# Patient Record
Sex: FEMALE | Race: BLACK OR AFRICAN AMERICAN | NOT HISPANIC OR LATINO | URBAN - METROPOLITAN AREA
[De-identification: names, ages, dates, MRNs, and addresses within clinical notes are randomized per-mention and may not be internally consistent; named-entity substitution may affect disease eponyms.]

---

## 2017-12-11 ENCOUNTER — INPATIENT (INPATIENT)
Facility: HOSPITAL | Age: 82
LOS: 6 days | Discharge: NURSING, REHAB OR LONG-TERM CARE | End: 2017-12-18
Attending: HOSPITALIST | Admitting: HOSPITALIST
Payer: MEDICARE

## 2017-12-11 VITALS
OXYGEN SATURATION: 99 % | TEMPERATURE: 99 F | HEART RATE: 79 BPM | RESPIRATION RATE: 18 BRPM | SYSTOLIC BLOOD PRESSURE: 159 MMHG | DIASTOLIC BLOOD PRESSURE: 79 MMHG

## 2017-12-11 DIAGNOSIS — G93.41 METABOLIC ENCEPHALOPATHY: ICD-10-CM

## 2017-12-11 DIAGNOSIS — I10 ESSENTIAL (PRIMARY) HYPERTENSION: ICD-10-CM

## 2017-12-11 DIAGNOSIS — N30.00 ACUTE CYSTITIS WITHOUT HEMATURIA: ICD-10-CM

## 2017-12-11 LAB
ALBUMIN SERPL ELPH-MCNC: 4 G/DL — SIGNIFICANT CHANGE UP (ref 3.3–5)
ALP SERPL-CCNC: 68 U/L — SIGNIFICANT CHANGE UP (ref 40–120)
ALT FLD-CCNC: 35 U/L — SIGNIFICANT CHANGE UP (ref 12–78)
AMMONIA BLD-MCNC: 13 UMOL/L — SIGNIFICANT CHANGE UP (ref 11–32)
ANION GAP SERPL CALC-SCNC: 7 MMOL/L — SIGNIFICANT CHANGE UP (ref 5–17)
APPEARANCE UR: CLEAR — SIGNIFICANT CHANGE UP
APTT BLD: 32.7 SEC — SIGNIFICANT CHANGE UP (ref 27.5–37.4)
AST SERPL-CCNC: 52 U/L — HIGH (ref 15–37)
BACTERIA # UR AUTO: ABNORMAL
BASOPHILS # BLD AUTO: 0 K/UL — SIGNIFICANT CHANGE UP (ref 0–0.2)
BASOPHILS NFR BLD AUTO: 0.4 % — SIGNIFICANT CHANGE UP (ref 0–2)
BILIRUB SERPL-MCNC: 0.6 MG/DL — SIGNIFICANT CHANGE UP (ref 0.2–1.2)
BILIRUB UR-MCNC: NEGATIVE — SIGNIFICANT CHANGE UP
BUN SERPL-MCNC: 27 MG/DL — HIGH (ref 7–23)
CALCIUM SERPL-MCNC: 9.6 MG/DL — SIGNIFICANT CHANGE UP (ref 8.5–10.1)
CHLORIDE SERPL-SCNC: 103 MMOL/L — SIGNIFICANT CHANGE UP (ref 96–108)
CK MB CFR SERPL CALC: 11.4 NG/ML — HIGH (ref 0.5–3.6)
CO2 SERPL-SCNC: 30 MMOL/L — SIGNIFICANT CHANGE UP (ref 22–31)
COLOR SPEC: YELLOW — SIGNIFICANT CHANGE UP
CREAT SERPL-MCNC: 1.17 MG/DL — SIGNIFICANT CHANGE UP (ref 0.5–1.3)
DIFF PNL FLD: ABNORMAL
EOSINOPHIL # BLD AUTO: 0 K/UL — SIGNIFICANT CHANGE UP (ref 0–0.5)
EOSINOPHIL NFR BLD AUTO: 0 % — SIGNIFICANT CHANGE UP (ref 0–6)
EPI CELLS # UR: SIGNIFICANT CHANGE UP
GLUCOSE SERPL-MCNC: 86 MG/DL — SIGNIFICANT CHANGE UP (ref 70–99)
GLUCOSE UR QL: NEGATIVE MG/DL — SIGNIFICANT CHANGE UP
HCT VFR BLD CALC: 38.2 % — SIGNIFICANT CHANGE UP (ref 34.5–45)
HGB BLD-MCNC: 12.6 G/DL — SIGNIFICANT CHANGE UP (ref 11.5–15.5)
INR BLD: 1.17 RATIO — HIGH (ref 0.88–1.16)
KETONES UR-MCNC: NEGATIVE — SIGNIFICANT CHANGE UP
LEUKOCYTE ESTERASE UR-ACNC: ABNORMAL
LYMPHOCYTES # BLD AUTO: 1.7 K/UL — SIGNIFICANT CHANGE UP (ref 1–3.3)
LYMPHOCYTES # BLD AUTO: 17.6 % — SIGNIFICANT CHANGE UP (ref 13–44)
MAGNESIUM SERPL-MCNC: 2.3 MG/DL — SIGNIFICANT CHANGE UP (ref 1.6–2.6)
MCHC RBC-ENTMCNC: 32.8 PG — SIGNIFICANT CHANGE UP (ref 27–34)
MCHC RBC-ENTMCNC: 33.1 GM/DL — SIGNIFICANT CHANGE UP (ref 32–36)
MCV RBC AUTO: 99.2 FL — SIGNIFICANT CHANGE UP (ref 80–100)
MONOCYTES # BLD AUTO: 1.3 K/UL — HIGH (ref 0–0.9)
MONOCYTES NFR BLD AUTO: 13.7 % — SIGNIFICANT CHANGE UP (ref 2–14)
NEUTROPHILS # BLD AUTO: 6.6 K/UL — SIGNIFICANT CHANGE UP (ref 1.8–7.4)
NEUTROPHILS NFR BLD AUTO: 68.2 % — SIGNIFICANT CHANGE UP (ref 43–77)
NITRITE UR-MCNC: NEGATIVE — SIGNIFICANT CHANGE UP
PH UR: 6 — SIGNIFICANT CHANGE UP (ref 5–8)
PLATELET # BLD AUTO: 125 K/UL — LOW (ref 150–400)
POTASSIUM SERPL-MCNC: 3.9 MMOL/L — SIGNIFICANT CHANGE UP (ref 3.5–5.3)
POTASSIUM SERPL-SCNC: 3.9 MMOL/L — SIGNIFICANT CHANGE UP (ref 3.5–5.3)
PROT SERPL-MCNC: 8.3 GM/DL — SIGNIFICANT CHANGE UP (ref 6–8.3)
PROT UR-MCNC: NEGATIVE MG/DL — SIGNIFICANT CHANGE UP
PROTHROM AB SERPL-ACNC: 12.8 SEC — HIGH (ref 9.8–12.7)
RBC # BLD: 3.85 M/UL — SIGNIFICANT CHANGE UP (ref 3.8–5.2)
RBC # FLD: 12.3 % — SIGNIFICANT CHANGE UP (ref 11–15)
RBC CASTS # UR COMP ASSIST: ABNORMAL /HPF (ref 0–4)
SODIUM SERPL-SCNC: 140 MMOL/L — SIGNIFICANT CHANGE UP (ref 135–145)
SP GR SPEC: 1.01 — SIGNIFICANT CHANGE UP (ref 1.01–1.02)
TROPONIN I SERPL-MCNC: 0.17 NG/ML — HIGH (ref 0.01–0.04)
TROPONIN I SERPL-MCNC: 0.18 NG/ML — HIGH (ref 0.01–0.04)
UROBILINOGEN FLD QL: NEGATIVE MG/DL — SIGNIFICANT CHANGE UP
WBC # BLD: 9.7 K/UL — SIGNIFICANT CHANGE UP (ref 3.8–10.5)
WBC # FLD AUTO: 9.7 K/UL — SIGNIFICANT CHANGE UP (ref 3.8–10.5)
WBC UR QL: ABNORMAL

## 2017-12-11 PROCEDURE — 71010: CPT | Mod: 26

## 2017-12-11 PROCEDURE — 99285 EMERGENCY DEPT VISIT HI MDM: CPT

## 2017-12-11 PROCEDURE — 99223 1ST HOSP IP/OBS HIGH 75: CPT

## 2017-12-11 PROCEDURE — 93010 ELECTROCARDIOGRAM REPORT: CPT

## 2017-12-11 PROCEDURE — 70450 CT HEAD/BRAIN W/O DYE: CPT | Mod: 26

## 2017-12-11 RX ORDER — CEFTRIAXONE 500 MG/1
1 INJECTION, POWDER, FOR SOLUTION INTRAMUSCULAR; INTRAVENOUS ONCE
Qty: 0 | Refills: 0 | Status: COMPLETED | OUTPATIENT
Start: 2017-12-11 | End: 2017-12-11

## 2017-12-11 RX ORDER — CEFTRIAXONE 500 MG/1
1 INJECTION, POWDER, FOR SOLUTION INTRAMUSCULAR; INTRAVENOUS EVERY 24 HOURS
Qty: 0 | Refills: 0 | Status: DISCONTINUED | OUTPATIENT
Start: 2017-12-11 | End: 2017-12-12

## 2017-12-11 RX ORDER — SODIUM CHLORIDE 9 MG/ML
1000 INJECTION INTRAMUSCULAR; INTRAVENOUS; SUBCUTANEOUS ONCE
Qty: 0 | Refills: 0 | Status: COMPLETED | OUTPATIENT
Start: 2017-12-11 | End: 2017-12-11

## 2017-12-11 RX ORDER — ENOXAPARIN SODIUM 100 MG/ML
30 INJECTION SUBCUTANEOUS DAILY
Qty: 0 | Refills: 0 | Status: DISCONTINUED | OUTPATIENT
Start: 2017-12-11 | End: 2017-12-18

## 2017-12-11 RX ORDER — ATENOLOL 25 MG/1
50 TABLET ORAL DAILY
Qty: 0 | Refills: 0 | Status: DISCONTINUED | OUTPATIENT
Start: 2017-12-11 | End: 2017-12-18

## 2017-12-11 RX ADMIN — ENOXAPARIN SODIUM 30 MILLIGRAM(S): 100 INJECTION SUBCUTANEOUS at 23:31

## 2017-12-11 RX ADMIN — ATENOLOL 50 MILLIGRAM(S): 25 TABLET ORAL at 16:29

## 2017-12-11 RX ADMIN — CEFTRIAXONE 100 GRAM(S): 500 INJECTION, POWDER, FOR SOLUTION INTRAMUSCULAR; INTRAVENOUS at 15:23

## 2017-12-11 RX ADMIN — SODIUM CHLORIDE 1000 MILLILITER(S): 9 INJECTION INTRAMUSCULAR; INTRAVENOUS; SUBCUTANEOUS at 15:23

## 2017-12-11 NOTE — ED ADULT TRIAGE NOTE - CHIEF COMPLAINT QUOTE
as per family member, " pt fell yesterday and hit her face and head. now she is more confused than normal and not acting like herself." pt denies headache. on amitriptyline and ativan at home. unknown last seen normal. as per family pt lives alone.

## 2017-12-11 NOTE — ED PROVIDER NOTE - PHYSICAL EXAMINATION
Gen: Frail, Alert, NAD  Head: NC, AT   Eyes: PERRL, EOMI, normal lids/conjunctiva  ENT: normal hearing, patent oropharynx without erythema/exudate, uvula midline  Neck: supple, no tenderness, Trachea midline  Pulm: Bilateral BS, normal resp effort, no wheeze/stridor/retractions  CV: RRR, no M/R/G, 2+ radial and dp pulses bl, no edema  Abd: soft, NT/ND, +BS, no hepatosplenomegaly  Mskel: extremities x4 with normal ROM and no joint effusions. no ctl spine ttp.   Skin: no rash, no bruising   Neuro: AAOx3, no sensory/motor deficits, CN 2-12 intact

## 2017-12-11 NOTE — H&P ADULT - NSHPPHYSICALEXAM_GEN_ALL_CORE
GENERAL: NAD well-developed  HEAD:  Atraumatic, Normocephalic  EYES: EOMI, PERRLA, conjunctiva and sclera clear  ENMT: No tonsillar erythema, exudates, or enlargement; Moist mucous membranes, Good dentition, No lesions  NECK: Supple, No JVD, Normal thyroid  NERVOUS SYSTEM:  Alert & Oriented X1, Motor Strength 5/5 B/L upper and lower extremities; DTRs 2+ intact and symmetric  CHEST/LUNG: Clear to percussion bilaterally; No rales, rhonchi, wheezing, or rubs  HEART: Regular rate and rhythm; No murmurs, rubs, or gallops  ABDOMEN: Soft, Nontender, Nondistended; Bowel sounds present  EXTREMITIES:  2+ Peripheral Pulses, No clubbing, cyanosis, or edema  LYMPH: No lymphadenopathy   SKIN: No rashes or lesions

## 2017-12-11 NOTE — ED PROVIDER NOTE - MEDICAL DECISION MAKING DETAILS
patient pw confusion. presently, she is oriented x3 and is following commands. will rule out subdural hematoma, elecrolyte abnormality and uti. suspect this is related to amitryptiline and or alprazolam. both medications can cause confusion. patient pw confusion. presently, she is oriented x3 and is following commands. will rule out subdural hematoma, elecrolyte abnormality and uti. suspect this is related to amitryptiline and or alprazolam. both medications can cause confusion. UA suggests infection, will treat UTI. Labs remarkable for slightly elevated trop. No anginal symptoms, possibly type 2 event. Will give asa pending negative CT. my read of ekg is nomarl sinus with q waves in the septal leads and possible inverted t waves in v5 isolated. no interval abnormalties

## 2017-12-11 NOTE — ED PROVIDER NOTE - OBJECTIVE STATEMENT
Pertinent PMH/PSH/FHx/SHx and Review of Systems contained within:  89f hx of anxiety pw confusion. patient reportedly fell and hit head yesterday. no loc. overnight was behaving abnormally, calling out to someone who was not there and banging pots, according to her tenant. patient reports that she is just a bit confused. she notes hunger. she denies cp, ha, sob, nausea, vomiting. of note, patient is prescribed amitryptiline 10mg 4 times a day prn as well as alprazolam.   Fh and Sh not otherwise contributory  ROS otherwise negative

## 2017-12-11 NOTE — H&P ADULT - NSHPLABSRESULTS_GEN_ALL_CORE
12.6   9.7   )-----------( 125      ( 11 Dec 2017 14:19 )             38.2   12-11    140  |  103  |  27<H>  ----------------------------<  86  3.9   |  30  |  1.17    Ca    9.6      11 Dec 2017 14:19  Mg     2.3     12-11    TPro  8.3  /  Alb  4.0  /  TBili  0.6  /  DBili  x   /  AST  52<H>  /  ALT  35  /  AlkPhos  68  12-11  Urine Microscopic-Add On (NC) (12.11.17 @ 14:19)    Red Blood Cell - Urine: 3-5 /HPF    White Blood Cell - Urine: 6-10    Bacteria: Few    Epithelial Cells: Few

## 2017-12-11 NOTE — ED PROVIDER NOTE - CARE PLAN
Principal Discharge DX:	Confusion Principal Discharge DX:	Confusion  Secondary Diagnosis:	Infective urethritis

## 2017-12-11 NOTE — H&P ADULT - HISTORY OF PRESENT ILLNESS
89f hx of anxiety pw confusion. patient reportedly fell and hit head yesterday. no loc. overnight was behaving abnormally, calling out to someone who was not there and banging pots, according to her tenant. patient reports that she is just a bit confused. she notes hunger. she denies cp, ha, sob, nausea, vomiting. of note, patient is prescribed amitryptiline that was started four days ago  - 10mg 4 times a day prn as well as alprazolam. patient is still very confused and does not recognize her family .

## 2017-12-11 NOTE — H&P ADULT - ASSESSMENT
89 female with history of anxiety with new onset Altered Mental Status - recently started on elavil- polypharmacy vs infecton vs cerebrovascular accident as etiology     IMPROVE VTE Individual Risk Assessment          RISK                                                          Points    [  ] Previous VTE                                                3    [  ] Thrombophilia                                             2    [  ] Lower limb paralysis                                    2        (unable to hold up >15 seconds)      [  ] Current Cancer                                             2         (within 6 months)    [ x ] Immobilization > 24 hrs                              1    [  ] ICU/CCU stay > 24 hours                            1    [x  ] Age > 60                                                    1    IMPROVE VTE Score ____2_____ 89 female with history of anxiety with new onset Altered Mental Status - recently started on elavil- polypharmacy vs infecton vs cerebrovascular accident as etiology     IMPROVE VTE Individual Risk Assessment          RISK                                                          Points    [  ] Previous VTE                                                3    [  x] Thrombophilia                                             2    [  ] Lower limb paralysis                                    2        (unable to hold up >15 seconds)      [  ] Current Cancer                                             2         (within 6 months)    [ x ] Immobilization > 24 hrs                              1    [  ] ICU/CCU stay > 24 hours                            1    [x  ] Age > 60                                                    1    IMPROVE VTE Score ____2_____    problems- metabolic encephalopathy  plan - hold anxiety meds  trat possible urinary tract infection  ct of head pending  problem 2- Hypertension - restart atenolol   problem 3 urinary tract infection - rocephin 89 female with history of anxiety with new onset Altered Mental Status - recently started on elavil- polypharmacy vs infecton vs cerebrovascular accident as etiology     IMPROVE VTE Individual Risk Assessment          RISK                                                          Points    [  ] Previous VTE                                                3    [  x] Thrombophilia                                             2    [  ] Lower limb paralysis                                    2        (unable to hold up >15 seconds)      [  ] Current Cancer                                             2         (within 6 months)    [ x ] Immobilization > 24 hrs                              1    [  ] ICU/CCU stay > 24 hours                            1    [x  ] Age > 60                                                    1    IMPROVE VTE Score ____2_____    problems- metabolic encephalopathy  plan - hold anxiety meds  trat possible urinary tract infection  ct of head pending  problem 2- Hypertension - restart atenolol   problem 3 urinary tract infection - rocephin   problem4- elevated troponin -trend troponin

## 2017-12-11 NOTE — ED ADULT NURSE NOTE - OBJECTIVE STATEMENT
patient received, came here for confusion that was noticed by daughter this morning. as per daughter, tenant noticed patient around 0200 banging pots and talking to herself and when daughter arrived patient was crying. patient is oriented to person, time and place but when asked questions, does not seem to understand and does not answer the questions correctly. patient is seen very happy.

## 2017-12-12 DIAGNOSIS — R41.0 DISORIENTATION, UNSPECIFIED: ICD-10-CM

## 2017-12-12 DIAGNOSIS — F09 UNSPECIFIED MENTAL DISORDER DUE TO KNOWN PHYSIOLOGICAL CONDITION: ICD-10-CM

## 2017-12-12 DIAGNOSIS — R74.8 ABNORMAL LEVELS OF OTHER SERUM ENZYMES: ICD-10-CM

## 2017-12-12 DIAGNOSIS — F05 DELIRIUM DUE TO KNOWN PHYSIOLOGICAL CONDITION: ICD-10-CM

## 2017-12-12 LAB
ANION GAP SERPL CALC-SCNC: 9 MMOL/L — SIGNIFICANT CHANGE UP (ref 5–17)
BUN SERPL-MCNC: 16 MG/DL — SIGNIFICANT CHANGE UP (ref 7–23)
CALCIUM SERPL-MCNC: 8.5 MG/DL — SIGNIFICANT CHANGE UP (ref 8.5–10.1)
CHLORIDE SERPL-SCNC: 108 MMOL/L — SIGNIFICANT CHANGE UP (ref 96–108)
CO2 SERPL-SCNC: 25 MMOL/L — SIGNIFICANT CHANGE UP (ref 22–31)
CREAT SERPL-MCNC: 1.04 MG/DL — SIGNIFICANT CHANGE UP (ref 0.5–1.3)
CULTURE RESULTS: SIGNIFICANT CHANGE UP
GLUCOSE SERPL-MCNC: 102 MG/DL — HIGH (ref 70–99)
HCT VFR BLD CALC: 38.9 % — SIGNIFICANT CHANGE UP (ref 34.5–45)
HGB BLD-MCNC: 12.5 G/DL — SIGNIFICANT CHANGE UP (ref 11.5–15.5)
MCHC RBC-ENTMCNC: 32.3 GM/DL — SIGNIFICANT CHANGE UP (ref 32–36)
MCHC RBC-ENTMCNC: 32.5 PG — SIGNIFICANT CHANGE UP (ref 27–34)
MCV RBC AUTO: 100.7 FL — HIGH (ref 80–100)
PLATELET # BLD AUTO: 122 K/UL — LOW (ref 150–400)
POTASSIUM SERPL-MCNC: 3.8 MMOL/L — SIGNIFICANT CHANGE UP (ref 3.5–5.3)
POTASSIUM SERPL-SCNC: 3.8 MMOL/L — SIGNIFICANT CHANGE UP (ref 3.5–5.3)
RBC # BLD: 3.86 M/UL — SIGNIFICANT CHANGE UP (ref 3.8–5.2)
RBC # FLD: 12 % — SIGNIFICANT CHANGE UP (ref 11–15)
SODIUM SERPL-SCNC: 142 MMOL/L — SIGNIFICANT CHANGE UP (ref 135–145)
SPECIMEN SOURCE: SIGNIFICANT CHANGE UP
T4 AB SER-ACNC: 7.2 UG/DL — SIGNIFICANT CHANGE UP (ref 4.6–12)
TROPONIN I SERPL-MCNC: 0.21 NG/ML — HIGH (ref 0.01–0.04)
TROPONIN I SERPL-MCNC: 0.21 NG/ML — HIGH (ref 0.01–0.04)
TSH SERPL-MCNC: 0.79 UU/ML — SIGNIFICANT CHANGE UP (ref 0.36–3.74)
VIT B12 SERPL-MCNC: 1437 PG/ML — HIGH (ref 243–894)
WBC # BLD: 9.4 K/UL — SIGNIFICANT CHANGE UP (ref 3.8–10.5)
WBC # FLD AUTO: 9.4 K/UL — SIGNIFICANT CHANGE UP (ref 3.8–10.5)

## 2017-12-12 PROCEDURE — 99233 SBSQ HOSP IP/OBS HIGH 50: CPT

## 2017-12-12 PROCEDURE — 99223 1ST HOSP IP/OBS HIGH 75: CPT

## 2017-12-12 PROCEDURE — 90792 PSYCH DIAG EVAL W/MED SRVCS: CPT

## 2017-12-12 RX ORDER — ATORVASTATIN CALCIUM 80 MG/1
10 TABLET, FILM COATED ORAL AT BEDTIME
Qty: 0 | Refills: 0 | Status: DISCONTINUED | OUTPATIENT
Start: 2017-12-12 | End: 2017-12-15

## 2017-12-12 RX ORDER — ASPIRIN/CALCIUM CARB/MAGNESIUM 324 MG
81 TABLET ORAL DAILY
Qty: 0 | Refills: 0 | Status: DISCONTINUED | OUTPATIENT
Start: 2017-12-12 | End: 2017-12-18

## 2017-12-12 RX ADMIN — Medication 0.5 MILLIGRAM(S): at 18:54

## 2017-12-12 RX ADMIN — ATENOLOL 50 MILLIGRAM(S): 25 TABLET ORAL at 06:16

## 2017-12-12 RX ADMIN — ENOXAPARIN SODIUM 30 MILLIGRAM(S): 100 INJECTION SUBCUTANEOUS at 13:34

## 2017-12-12 NOTE — CONSULT NOTE ADULT - ASSESSMENT
88yo lady with a hx of anxiety; sent to the ER after being found confused and minimally responsive.  Currently hallucinating.  Of note, patient prescribed amitriptyline and xanax a few days ago; multiple pills missing.  Patient is still very confused and does not recognize her family.  Head CT unremarkable.  Mumbling and making no sense.    No hx of CAD.  No tele events.  trop 0.1 - 0.2  EKG normal.    Elevated troponin   Acute delirium  Acute cystitis without hematuria  Essential hypertension  Metabolic encephalopathy    -mild troponin leak likely secondary to infection/delirium/withdrawl and NOT ACS  -cont to treat metabolic encephalopathy; consider repeat head CT  -supportive care  -?abx for UTI/cystitis  -cont asa/atenolol/simvastatin  -outpatient cardiac evaluation when stable

## 2017-12-12 NOTE — PROGRESS NOTE ADULT - PROBLEM SELECTOR PLAN 1
- pt was prescribed Amitriptylline and xanax by her neurologist ( [phone]) in october. Unsure if overdose or withdrawl  ??  -discussed w/   - As per joe Gambino ,pt was fully independent before this episode and was at baseline mental status earlier on sunday too - CT head showed no abnormalities   - pt was prescribed Amitriptylline and xanax by her neurologist ( [phone]) in october. Unsure if overdose or withdrawl  ??  -discussed w/   - As per joe Gambino ,pt was fully independent before this episode and was at baseline mental status earlier on sunday too

## 2017-12-12 NOTE — BEHAVIORAL HEALTH ASSESSMENT NOTE - SUMMARY
Briefly, the patient is an 89 year old woman, single, lives at home by herself, has a tenant who lives upstairs, niece lives a mile away and is supportive, has a medical history notable for HTN, has no history of mental health issues, has no history of si or hi, and no history of substance abuse, presented to the ed yesterday with family after it was noted that she was confused and disoriented. Admitted for treatment of UTI, on Ceftriaxone. Calm, can be restless, is confused, disoriented, VH++. Unable to engage patient in any meaningful conversation. No UE rigidity or cogwheeling, no evident tremors at rest.  Based on assessment, and collateral information obtained from niece, this is an acute change in mentation for the patient. As of now, logical explanation is this is medication related- anticholinergic side effect from recent start of Amitriptyline? vs Xanax withdrawal? Discussed with Dr Isaac that for now hold Amitriptyline. Also start on a CIWA protocol to monitor for benzodiazepine withdrawal and give Briefly, the patient is an 89 year old woman, single, lives at home by herself, has a tenant who lives upstairs, niece lives a mile away and is supportive, has a medical history notable for HTN, has no history of mental health issues, has no history of si or hi, and no history of substance abuse, presented to the ed yesterday with family after it was noted that she was confused and disoriented. Admitted for treatment of UTI, on Ceftriaxone. Calm, can be restless, is confused, disoriented, VH++. Unable to engage patient in any meaningful conversation. No UE rigidity or cogwheeling, no evident tremors at rest.  Based on assessment, and collateral information obtained from niece, this is an acute change in mentation for the patient. As of now, logical explanation is this is medication related- anticholinergic side effect from recent start of Amitriptyline? vs Xanax withdrawal? Discussed with Dr Isaac that for now hold Amitriptyline. Also start on a CIWA protocol to monitor for benzodiazepine withdrawal and give low dose Ativan 0.5mg q 6hrs prn IM/IV/PO for w/d symptoms. If confusion does not resolve in a few days, please consider a Brain MRI.

## 2017-12-12 NOTE — PROGRESS NOTE ADULT - SUBJECTIVE AND OBJECTIVE BOX
Patient is a 89y old  Female who presents with a chief complaint of Altered Mental Status (11 Dec 2017 15:57)       OVERNIGHT EVENTS: confused    MEDICATIONS  (STANDING):  ATENolol  Tablet 50 milliGRAM(s) Oral daily  cefTRIAXone   IVPB 1 Gram(s) IV Intermittent every 24 hours  enoxaparin Injectable 30 milliGRAM(s) SubCutaneous daily    MEDICATIONS  (PRN):    Vital Signs Last 24 Hrs  T(C): 37 (12 Dec 2017 05:33), Max: 37.3 (11 Dec 2017 16:21)  T(F): 98.6 (12 Dec 2017 05:33), Max: 99.1 (11 Dec 2017 16:21)  HR: 78 (12 Dec 2017 05:33) (66 - 80)  BP: 155/81 (12 Dec 2017 05:33) (152/62 - 175/82)  BP(mean): --  RR: 18 (12 Dec 2017 05:33) (16 - 20)  SpO2: 95% (12 Dec 2017 05:33) (94% - 99%)    PHYSICAL EXAM:  GENERAL: NAD, well-groomed, well-developed  HEAD:  Atraumatic, Normocephalic  EYES: EOMI, PERRLA, conjunctiva and sclera clear  ENMT: No tonsillar erythema, exudates, or enlargement; Moist mucous membranes   NECK: Supple, No JVD   NERVOUS SYSTEM: Awake, not oriented , confused, speaking to herself   CHEST/LUNG: Clear to auscultation  bilaterally; No rales, rhonchi, wheezing, or rubs  HEART: Regular rate and rhythm; No murmurs, rubs, or gallops  ABDOMEN: Soft, Nontender, Nondistended; Bowel sounds present  EXTREMITIES:  2+ Peripheral Pulses, No clubbing, cyanosis, or edema  LYMPH: No lymphadenopathy noted  SKIN: No rashes or lesions    LABS:                        12.5   9.4   )-----------( 122      ( 12 Dec 2017 04:14 )             38.9     12-12    142  |  108  |  16  ----------------------------<  102<H>  3.8   |  25  |  1.04    Ca    8.5      12 Dec 2017 04:14  Mg     2.3     12-11    TPro  8.3  /  Alb  4.0  /  TBili  0.6  /  DBili  x   /  AST  52<H>  /  ALT  35  /  AlkPhos  68  12-11    PT/INR - ( 11 Dec 2017 14:19 )   PT: 12.8 sec;   INR: 1.17 ratio         PTT - ( 11 Dec 2017 14:19 )  PTT:32.7 sec   cardiac markers Troponin .207  CK --  Troponin .165  CK --  Troponin .176  CK --    Urinalysis Basic - ( 11 Dec 2017 14:19 )    Color: Yellow / Appearance: Clear / S.010 / pH: x  Gluc: x / Ketone: Negative  / Bili: Negative / Urobili: Negative mg/dL   Blood: x / Protein: Negative mg/dL / Nitrite: Negative   Leuk Esterase: Moderate / RBC: 3-5 /HPF / WBC 6-10   Sq Epi: x / Non Sq Epi: Few / Bacteria: Few      CAPILLARY BLOOD GLUCOSE        Cultures    RADIOLOGY & ADDITIONAL TESTS:    Imaging Personally Reviewed:  [x ] YES  [ ] NO    Consultant(s) Notes Reviewed:  [x ] YES  [ ] NO    Care Discussed with Consultants/Other Providers [x ] YES  [ ] NO

## 2017-12-12 NOTE — BEHAVIORAL HEALTH ASSESSMENT NOTE - RISK ASSESSMENT
confused, disoriented, possible cognitive decline at baseline, needs more services and supports in community- unclear about extent of residual cognitive deficits at end of this hospital course. No aggression, no history of mental health issues.

## 2017-12-12 NOTE — CONSULT NOTE ADULT - SUBJECTIVE AND OBJECTIVE BOX
CARDIOLOGY CONSULT NOTE    Patient is a 89y Female with a known history of :  Cognitive disorder (F09)  Delirium due to another medical condition, acute, mixed level of activity (F05)  Elevated troponin (R74.8)  Acute delirium (R41.0)  Acute cystitis without hematuria (N30.00)  Essential hypertension (I10)  Metabolic encephalopathy (G93.41)    HPI:  88yo lady with a hx of anxiety; sent to the ER after being found confused and minimally responsive.  Currently hallucinating.  Of note, patient prescribed amitriptyline and xanax a few days ago; multiple pills missing.  patient is still very confused and does not recognize her family.  Head CT unremarkable.  No hx of CAD.  No tele events.  trop 0.1 - 0.2    REVIEW OF SYSTEMS:  unable to obtain    MEDICATIONS  (STANDING):  aspirin enteric coated 81 milliGRAM(s) Oral daily  ATENolol  Tablet 50 milliGRAM(s) Oral daily  atorvastatin 10 milliGRAM(s) Oral at bedtime  enoxaparin Injectable 30 milliGRAM(s) SubCutaneous daily    MEDICATIONS  (PRN):      ALLERGIES: No Known Allergies      FAMILY HISTORY:  No pertinent family history in first degree relatives      PHYSICAL EXAMINATION:  -----------------------------  T(C): 36.1 (12-12-17 @ 11:05), Max: 37.3 (12-11-17 @ 16:21)  HR: 80 (12-12-17 @ 11:05) (66 - 80)  BP: 145/75 (12-12-17 @ 11:05) (145/75 - 175/82)  RR: 18 (12-12-17 @ 05:33) (16 - 20)  SpO2: 96% (12-12-17 @ 11:05) (94% - 99%)  Wt(kg): --    12-11 @ 07:01  -  12-12 @ 07:00  --------------------------------------------------------  IN:  Total IN: 0 mL    OUT:    Voided: 400 mL  Total OUT: 400 mL    Total NET: -400 mL        Height (cm): 154.94 (12-12 @ 02:45)  Weight (kg): 49.9 (12-12 @ 02:45)  BMI (kg/m2): 20.8 (12-12 @ 02:45)  BSA (m2): 1.47 (12-12 @ 02:45)    Constitutional: hallucinating; responding appropriately   Eyes: the conjunctiva exhibited no abnormalities and the eyelids demonstrated no xanthelasmas.   HEENT: normal oral mucosa, no oral pallor and no oral cyanosis.   Neck: normal jugular venous A waves present, normal jugular venous V waves present and no jugular venous hammonds A waves.   Pulmonary: no respiratory distress, normal respiratory rhythm and effort, no accessory muscle use and lungs were clear to auscultation bilaterally.   Cardiovascular: heart rate and rhythm were normal, normal S1 and S2 and no murmur, gallop, rub, heave or thrill are present.   Abdomen: soft, non-tender, no hepato-splenomegaly and no abdominal mass palpated.   Musculoskeletal: the gait could not be assessed..   Extremities: no clubbing of the fingernails, no localized cyanosis, no petechial hemorrhages and no ischemic changes.   Skin: normal skin color and pigmentation, no rash, no venous stasis, no skin lesions, no skin ulcer and no xanthoma was observed.   Psychiatric: oriented to person, place, and time, the affect was normal, the mood was normal and not feeling anxious.     LABS:   --------  12-12    142  |  108  |  16  ----------------------------<  102<H>  3.8   |  25  |  1.04    Ca    8.5      12 Dec 2017 04:14  Mg     2.3     12-11    TPro  8.3  /  Alb  4.0  /  TBili  0.6  /  DBili  x   /  AST  52<H>  /  ALT  35  /  AlkPhos  68  12-11                         12.5   9.4   )-----------( 122      ( 12 Dec 2017 04:14 )             38.9     PT/INR - ( 11 Dec 2017 14:19 )   PT: 12.8 sec;   INR: 1.17 ratio         PTT - ( 11 Dec 2017 14:19 )  PTT:32.7 sec    12-12 @ 11:44 CPK total:--, CKMB --, Troponin I - .207 ng/mL<H>  12-12 @ 04:14 CPK total:--, CKMB --, Troponin I - .207 ng/mL<H>  12-11 @ 20:10 CPK total:--, CKMB --, Troponin I - .165 ng/mL<H>  12-11 @ 14:19 CPK total:--, CKMB --, Troponin I - .176 ng/mL<H>          RADIOLOGY:  -----------------        ECG: CARDIOLOGY CONSULT NOTE    Patient is a 89y Female with a known history of :  Cognitive disorder (F09)  Delirium due to another medical condition, acute, mixed level of activity (F05)  Elevated troponin (R74.8)  Acute delirium (R41.0)  Acute cystitis without hematuria (N30.00)  Essential hypertension (I10)  Metabolic encephalopathy (G93.41)    HPI:  88yo lady with a hx of anxiety; sent to the ER after being found confused and minimally responsive.  Currently hallucinating.  Of note, patient prescribed amitriptyline and xanax a few days ago; multiple pills missing.  patient is still very confused and does not recognize her family.  Head CT unremarkable.  No hx of CAD.  No tele events.  trop 0.1 - 0.2    REVIEW OF SYSTEMS:  unable to obtain    MEDICATIONS  (STANDING):  aspirin enteric coated 81 milliGRAM(s) Oral daily  ATENolol  Tablet 50 milliGRAM(s) Oral daily  atorvastatin 10 milliGRAM(s) Oral at bedtime  enoxaparin Injectable 30 milliGRAM(s) SubCutaneous daily    MEDICATIONS  (PRN):      ALLERGIES: No Known Allergies      FAMILY HISTORY:  No pertinent family history in first degree relatives      PHYSICAL EXAMINATION:  -----------------------------  T(C): 36.1 (12-12-17 @ 11:05), Max: 37.3 (12-11-17 @ 16:21)  HR: 80 (12-12-17 @ 11:05) (66 - 80)  BP: 145/75 (12-12-17 @ 11:05) (145/75 - 175/82)  RR: 18 (12-12-17 @ 05:33) (16 - 20)  SpO2: 96% (12-12-17 @ 11:05) (94% - 99%)  Wt(kg): --    12-11 @ 07:01  -  12-12 @ 07:00  --------------------------------------------------------  IN:  Total IN: 0 mL    OUT:    Voided: 400 mL  Total OUT: 400 mL    Total NET: -400 mL        Height (cm): 154.94 (12-12 @ 02:45)  Weight (kg): 49.9 (12-12 @ 02:45)  BMI (kg/m2): 20.8 (12-12 @ 02:45)  BSA (m2): 1.47 (12-12 @ 02:45)    Constitutional: hallucinating; responding appropriately   Eyes: the conjunctiva exhibited no abnormalities and the eyelids demonstrated no xanthelasmas.   HEENT: normal oral mucosa, no oral pallor and no oral cyanosis.   Neck: normal jugular venous A waves present, normal jugular venous V waves present and no jugular venous hammonds A waves.   Pulmonary: no respiratory distress  Cardiovascular: heart rate and rhythm were normal, normal S1 and S2 and no murmur, gallop, rub, heave or thrill are present.   Abdomen: soft, non-tender, no hepato-splenomegaly and no abdominal mass palpated.   Musculoskeletal: the gait could not be assessed..   Extremities: no clubbing of the fingernails, no localized cyanosis, no petechial hemorrhages and no ischemic changes.   Skin: normal skin color and pigmentation, no rash, no venous stasis, no skin lesions, no skin ulcer and no xanthoma was observed.   Psychiatric:  A + O x 0    LABS:   --------  12-12    142  |  108  |  16  ----------------------------<  102<H>  3.8   |  25  |  1.04    Ca    8.5      12 Dec 2017 04:14  Mg     2.3     12-11    TPro  8.3  /  Alb  4.0  /  TBili  0.6  /  DBili  x   /  AST  52<H>  /  ALT  35  /  AlkPhos  68  12-11                         12.5   9.4   )-----------( 122      ( 12 Dec 2017 04:14 )             38.9     PT/INR - ( 11 Dec 2017 14:19 )   PT: 12.8 sec;   INR: 1.17 ratio         PTT - ( 11 Dec 2017 14:19 )  PTT:32.7 sec    12-12 @ 11:44 CPK total:--, CKMB --, Troponin I - .207 ng/mL<H>  12-12 @ 04:14 CPK total:--, CKMB --, Troponin I - .207 ng/mL<H>  12-11 @ 20:10 CPK total:--, CKMB --, Troponin I - .165 ng/mL<H>  12-11 @ 14:19 CPK total:--, CKMB --, Troponin I - .176 ng/mL<H>      RADIOLOGY:  -----------------    ECG: NSR 73bpm

## 2017-12-12 NOTE — PROGRESS NOTE ADULT - PROBLEM SELECTOR PLAN 2
- trend trops, EKG noted  - cardiology  consulted  - Telemetry monitoring - trend trops, EKG noted  - on ASA, statin   - cardiology  consulted  - Telemetry monitoring - trend trops, EKG noted  - on ASA, statin, BB   - cardiology  consulted  - Telemetry monitoring

## 2017-12-12 NOTE — PHYSICAL THERAPY INITIAL EVALUATION ADULT - MODIFIED CLINICAL TEST OF SENSORY INTEGRATION IN BALANCE TEST
Barthel Index: Feeding Score _5__, Bathing Score _0__, Grooming Score __0_, Dressing Score _0__, Bowels Score __0_, Bladder Score ___0, Toilet Score __0_, Transfers Score __5_, Mobility Score __0_, Stairs Score _0__,     Total Score _10__

## 2017-12-12 NOTE — BEHAVIORAL HEALTH ASSESSMENT NOTE - HPI (INCLUDE ILLNESS QUALITY, SEVERITY, DURATION, TIMING, CONTEXT, MODIFYING FACTORS, ASSOCIATED SIGNS AND SYMPTOMS)
Briefly, the patient is an 89 year old woman, single, lives at home by herself, has a tenant who lives upstairs, niece lives a mile away and is supportive, has a medical history notable for HTN, has no history of mental health issues, has no history of si or hi, and no history of substance abuse, presented to the ed yesterday with family after it was noted that she was confused and disoriented. Admitted for treatment of UTI, on Ceftriaxone.   Calm, but confused, disoriented, VH++. Unable to engage patient in any meaningful conversation. No UE rigidity or cogwheeling, no evident tremors at rest.   Obtained collateral from the patient's niece, who states that at baseline patient is grossly cognitively intact, except for tendency to repeat herself. She is independent in ADL's and IADLs with some minimal assistance. No substance abuse, no mental health issues. Joe states that patient had a fall on Sunday morning, and was mentating fine till evening when she was noted to be confused. Reviewed medications, and joe states that outpatient neurologist started her on Amitriptyline 10mg qid prn po on December 6. States that she was prescribed 120 tablets, and now there are 104 tablets in bottle. Neurologist prescribed Xanax 1mg qid prn po in October- 120 tablets, and now there are 7 tablets remaining. Joe states that even though neurologist discontinued xanax, she is unsure if patient was still taking it as needed.   Obtained collateral from the patient's neurologist- Dr Burnette ([phone]) and he states that patient is being treated for carpal tunnel and cervical radiculopathy. He states that patient likely was not taking Amitriptyline as prescribed, and when she called him a few days ago sounded confused over the phone.

## 2017-12-13 LAB
FOLATE SERPL-MCNC: >20 NG/ML — SIGNIFICANT CHANGE UP (ref 4.8–24.2)
T3 SERPL-MCNC: 87 NG/DL — SIGNIFICANT CHANGE UP (ref 80–200)

## 2017-12-13 PROCEDURE — 99233 SBSQ HOSP IP/OBS HIGH 50: CPT

## 2017-12-13 RX ADMIN — ENOXAPARIN SODIUM 30 MILLIGRAM(S): 100 INJECTION SUBCUTANEOUS at 11:49

## 2017-12-13 RX ADMIN — ATORVASTATIN CALCIUM 10 MILLIGRAM(S): 80 TABLET, FILM COATED ORAL at 21:08

## 2017-12-13 RX ADMIN — Medication 81 MILLIGRAM(S): at 11:48

## 2017-12-13 RX ADMIN — ATENOLOL 50 MILLIGRAM(S): 25 TABLET ORAL at 07:07

## 2017-12-13 RX ADMIN — Medication 0.5 MILLIGRAM(S): at 00:51

## 2017-12-13 NOTE — PROGRESS NOTE ADULT - SUBJECTIVE AND OBJECTIVE BOX
Patient is a 89y old  Female who presents with a chief complaint of Altered Mental Status (11 Dec 2017 15:57)       OVERNIGHT EVENTS:     MEDICATIONS  (STANDING):  aspirin enteric coated 81 milliGRAM(s) Oral daily  ATENolol  Tablet 50 milliGRAM(s) Oral daily  atorvastatin 10 milliGRAM(s) Oral at bedtime  enoxaparin Injectable 30 milliGRAM(s) SubCutaneous daily    MEDICATIONS  (PRN):  LORazepam   Injectable 0.5 milliGRAM(s) IV Push every 6 hours PRN Agitation      Vital Signs Last 24 Hrs  T(C): 37.1 (13 Dec 2017 06:53), Max: 37.1 (12 Dec 2017 16:50)  T(F): 98.8 (13 Dec 2017 06:53), Max: 98.8 (12 Dec 2017 16:50)  HR: 75 (13 Dec 2017 06:53) (75 - 86)  BP: 155/70 (13 Dec 2017 06:53) (152/71 - 161/74)  BP(mean): --  RR: 18 (13 Dec 2017 06:53) (18 - 18)  SpO2: 96% (13 Dec 2017 06:53) (96% - 98%)     PHYSICAL EXAM:  GENERAL: NAD, well-groomed, well-developed  HEAD:  Atraumatic, Normocephalic  EYES: EOMI, PERRLA, conjunctiva and sclera clear  ENMT: No tonsillar erythema, exudates, or enlargement; Moist mucous membranes   NECK: Supple, No JVD   NERVOUS SYSTEM: Awake,  orientedx1 , improved mental status since yesterday, recognizes her grand niece at bedside    CHEST/LUNG: Clear to auscultation  bilaterally; No rales, rhonchi, wheezing, or rubs  HEART: Regular rate and rhythm; No murmurs, rubs, or gallops  ABDOMEN: Soft, Nontender, Nondistended; Bowel sounds present  EXTREMITIES:  2+ Peripheral Pulses, No clubbing, cyanosis, or edema  LYMPH: No lymphadenopathy noted  SKIN: No rashes or lesions    LABS:                        12.5   9.4   )-----------( 122      ( 12 Dec 2017 04:14 )             38.9     12-12    142  |  108  |  16  ----------------------------<  102<H>  3.8   |  25  |  1.04    Ca    8.5      12 Dec 2017 04:14         cardiac markers     CAPILLARY BLOOD GLUCOSE        Cultures    RADIOLOGY & ADDITIONAL TESTS:    Imaging Personally Reviewed:  [ x] YES  [ ] NO    Consultant(s) Notes Reviewed:  [x ] YES  [ ] NO    Care Discussed with Consultants/Other Providers [x] YES  [ ] NO

## 2017-12-13 NOTE — PROGRESS NOTE ADULT - PROBLEM SELECTOR PLAN 1
- CT head showed no abnormalities   - pt was prescribed Amitriptylline and xanax by her neurologist ( [phone]) .As per grand niece Nicollette (she is RN, and checks on pt frequently, last saw her well on friday evening) pt picked up amitriptylline (120 tabs)on friday and now has 104 pills in bottle  . likely unintentional overdose   ?? also she noted that pt has been forgetful lately and agrees for possiblility that pt might have unintentionally overdosed on amitriptylline   -Psych on board , once stable will d/c to [facility]

## 2017-12-13 NOTE — PROGRESS NOTE ADULT - PROBLEM SELECTOR PLAN 2
-   trops likely 2/2 demand ischemia    - on ASA, statin, BB   - cardiology  input noted  - d/c Telemetry monitoring

## 2017-12-14 PROCEDURE — 99232 SBSQ HOSP IP/OBS MODERATE 35: CPT

## 2017-12-14 PROCEDURE — 99233 SBSQ HOSP IP/OBS HIGH 50: CPT

## 2017-12-14 RX ORDER — LANOLIN ALCOHOL/MO/W.PET/CERES
3 CREAM (GRAM) TOPICAL
Qty: 0 | Refills: 0 | Status: DISCONTINUED | OUTPATIENT
Start: 2017-12-14 | End: 2017-12-18

## 2017-12-14 RX ADMIN — ATORVASTATIN CALCIUM 10 MILLIGRAM(S): 80 TABLET, FILM COATED ORAL at 21:29

## 2017-12-14 RX ADMIN — Medication 81 MILLIGRAM(S): at 11:45

## 2017-12-14 RX ADMIN — ATENOLOL 50 MILLIGRAM(S): 25 TABLET ORAL at 06:03

## 2017-12-14 RX ADMIN — Medication 3 MILLIGRAM(S): at 20:13

## 2017-12-14 RX ADMIN — ENOXAPARIN SODIUM 30 MILLIGRAM(S): 100 INJECTION SUBCUTANEOUS at 11:45

## 2017-12-14 NOTE — PROGRESS NOTE BEHAVIORAL HEALTH - NSBHCONSULTMEDS_PSY_A_CORE FT
No indication to start a psychotropic. Start Melatonin 3mg q hs po.  NO antipsychotics- no family consent for use.

## 2017-12-14 NOTE — PROGRESS NOTE ADULT - SUBJECTIVE AND OBJECTIVE BOX
Patient is a 89y old  Female who presents with a chief complaint of Altered Mental Status (11 Dec 2017 15:57)       OVERNIGHT EVENTS:    MEDICATIONS  (STANDING):  aspirin enteric coated 81 milliGRAM(s) Oral daily  ATENolol  Tablet 50 milliGRAM(s) Oral daily  atorvastatin 10 milliGRAM(s) Oral at bedtime  enoxaparin Injectable 30 milliGRAM(s) SubCutaneous daily  melatonin 3 milliGRAM(s) Oral <User Schedule>    MEDICATIONS  (PRN):       Vital Signs Last 24 Hrs  T(C): 36.4 (14 Dec 2017 11:12), Max: 37.1 (14 Dec 2017 00:15)  T(F): 97.6 (14 Dec 2017 11:12), Max: 98.8 (14 Dec 2017 00:15)  HR: 87 (14 Dec 2017 11:12) (69 - 87)  BP: 147/79 (14 Dec 2017 11:12) (146/41 - 169/84)  BP(mean): --  RR: 19 (14 Dec 2017 11:12) (18 - 20)  SpO2: 97% (14 Dec 2017 11:12) (96% - 97%)     PHYSICAL EXAM:  GENERAL: NAD, well-groomed, well-developed  HEAD:  Atraumatic, Normocephalic  EYES: EOMI, PERRLA, conjunctiva and sclera clear  ENMT: No tonsillar erythema, exudates, or enlargement; Moist mucous membranes   NECK: Supple, No JVD   NERVOUS SYSTEM: Awake, oriented to herself, place, improved mental status, grand niece at bedside   CHEST/LUNG: Clear to auscultation  bilaterally; No rales, rhonchi, wheezing, or rubs  HEART: Regular rate and rhythm; No murmurs, rubs, or gallops  ABDOMEN: Soft, Nontender, Nondistended; Bowel sounds present  EXTREMITIES:  2+ Peripheral Pulses, No clubbing, cyanosis, or edema  LYMPH: No lymphadenopathy noted  SKIN: No rashes or lesions      LABS:             cardiac markers     CAPILLARY BLOOD GLUCOSE        Cultures    RADIOLOGY & ADDITIONAL TESTS:    Imaging Personally Reviewed:  [x ] YES  [ ] NO    Consultant(s) Notes Reviewed:  [x ] YES  [ ] NO    Care Discussed with Consultants/Other Providers [x ] YES  [ ] NO

## 2017-12-14 NOTE — PROGRESS NOTE ADULT - PROBLEM SELECTOR PLAN 1
- CT head showed no abnormalities   - pt was prescribed Amitriptylline and xanax by her neurologist ( [phone]) .As per grand niece Nicollette (she is RN, and checks on pt frequently, last saw her well on friday evening) pt picked up amitriptylline (120 tabs)on friday and now has 104 pills in bottle  . likely unintentional overdose   ?? also she noted that pt has been forgetful lately and agrees for possiblility that pt might have unintentionally overdosed on amitriptylline   -Psych on board ,   will d/c to [facility]

## 2017-12-14 NOTE — PROGRESS NOTE BEHAVIORAL HEALTH - NSBHCONSULTSUBSTANCEALCOHOL_PSY_A_CORE FT
continue ciwa. Give Ativan 0.5mg PO/IM/IV only for w/d symptoms. Hold for sbp<110, rr<12, or sedation

## 2017-12-14 NOTE — PROGRESS NOTE BEHAVIORAL HEALTH - NSBHFUPINTERVALHXFT_PSY_A_CORE
Calmer and improving mentation. Met with the patient today. Alert and oriented. Not oriented to situation. Still some residual confusion. Denies any a/vh or paranoia. Calm, no aggression.   Discussed at length with grand niece:   - need for more supervision at home  - need to ensure that patient does not have access to meds. Limited supply in pill box for week.   - Ongoing delirium, possible cognitive decline at baseline.   - Need for being more involved in doctor's appts so that recommendations can be understood and followed through better.

## 2017-12-14 NOTE — PROGRESS NOTE BEHAVIORAL HEALTH - NSBHCONSULTFOLLOWAFTERCARE_PSY_A_CORE FT
AS PER MEDICAL TEAM. If patient complains of neuropathy, suggest a neurology consult at rehab. Avoid Gabapentin.

## 2017-12-14 NOTE — PROGRESS NOTE BEHAVIORAL HEALTH - RISK ASSESSMENT
LOW: delirium, cognitive disorder, no aggression or agitation, no psychosis, no si or hi, will receive supervision.

## 2017-12-15 PROCEDURE — 99239 HOSP IP/OBS DSCHRG MGMT >30: CPT

## 2017-12-15 RX ORDER — AMITRIPTYLINE HCL 25 MG
0 TABLET ORAL
Qty: 0 | Refills: 0 | COMMUNITY

## 2017-12-15 RX ORDER — ATENOLOL 25 MG/1
1 TABLET ORAL
Qty: 0 | Refills: 0 | DISCHARGE
Start: 2017-12-15

## 2017-12-15 RX ORDER — LANOLIN ALCOHOL/MO/W.PET/CERES
1 CREAM (GRAM) TOPICAL
Qty: 0 | Refills: 0 | DISCHARGE
Start: 2017-12-15

## 2017-12-15 RX ORDER — ALPRAZOLAM 0.25 MG
0 TABLET ORAL
Qty: 0 | Refills: 0 | COMMUNITY

## 2017-12-15 RX ORDER — ATENOLOL 25 MG/1
1 TABLET ORAL
Qty: 0 | Refills: 0 | COMMUNITY

## 2017-12-15 RX ADMIN — ATENOLOL 50 MILLIGRAM(S): 25 TABLET ORAL at 05:47

## 2017-12-15 RX ADMIN — Medication 3 MILLIGRAM(S): at 20:05

## 2017-12-15 RX ADMIN — Medication 81 MILLIGRAM(S): at 11:34

## 2017-12-15 RX ADMIN — ENOXAPARIN SODIUM 30 MILLIGRAM(S): 100 INJECTION SUBCUTANEOUS at 11:34

## 2017-12-15 NOTE — DISCHARGE NOTE ADULT - HOSPITAL COURSE
88y/o F w/h/o HTN was BIB EMS for confusion. Pt was found by the tenants to be confused , banging pots.  Acute delirium.   - CT head showed no abnormalities   - pt was prescribed Amitriptylline and xanax by her neurologist ( [phone]) .As per grand niece Nicollette (she is RN, and checks on pt frequently, last saw her well on friday evening) pt picked up amitriptylline (120 tabs)on friday and now has 104 pills in bottle  . likely unintentional overdose   ?? also she noted that pt has been forgetful lately and agrees for possiblility that pt might have unintentionally overdosed on amitriptylline . improved mental status , cleared by sb to d/c   -Psych on board ,   will d/c to [facility].  discussed w/pt's grand nisandhya Obrien. in length

## 2017-12-15 NOTE — DISCHARGE NOTE ADULT - MEDICATION SUMMARY - MEDICATIONS TO TAKE
I will START or STAY ON the medications listed below when I get home from the hospital:    atenolol 50 mg oral tablet  -- 1 tab(s) by mouth once a day  -- Indication: For Essential hypertension    melatonin 3 mg oral tablet  -- 1 tab(s) by mouth   -- Indication: For Acute delirium

## 2017-12-15 NOTE — DISCHARGE NOTE ADULT - MEDICATION SUMMARY - MEDICATIONS TO STOP TAKING
I will STOP taking the medications listed below when I get home from the hospital:    amitriptyline 10 mg oral tablet  -- orally 4 times a day, As Needed    ALPRAZolam 1 mg oral tablet  -- orally 4 times a day, As Needed

## 2017-12-15 NOTE — DISCHARGE NOTE ADULT - PATIENT PORTAL LINK FT
“You can access the FollowHealth Patient Portal, offered by [facility], by registering with the following website: [email or web address]”

## 2017-12-15 NOTE — PROGRESS NOTE ADULT - SUBJECTIVE AND OBJECTIVE BOX
Patient is a 89y old  Female who presents with a chief complaint of Altered Mental Status (11 Dec 2017 15:57)       OVERNIGHT EVENTS:    MEDICATIONS  (STANDING):  aspirin enteric coated 81 milliGRAM(s) Oral daily  ATENolol  Tablet 50 milliGRAM(s) Oral daily  atorvastatin 10 milliGRAM(s) Oral at bedtime  enoxaparin Injectable 30 milliGRAM(s) SubCutaneous daily  melatonin 3 milliGRAM(s) Oral <User Schedule>    MEDICATIONS  (PRN):       Vital Signs Last 24 Hrs  T(C): 36.7 (15 Dec 2017 11:12), Max: 37.1 (14 Dec 2017 16:30)  T(F): 98 (15 Dec 2017 11:12), Max: 98.8 (14 Dec 2017 16:30)  HR: 78 (15 Dec 2017 11:12) (71 - 90)  BP: 125/58 (15 Dec 2017 11:12) (117/63 - 138/80)  BP(mean): --  RR: 17 (15 Dec 2017 11:12) (16 - 18)  SpO2: 97% (15 Dec 2017 11:12) (95% - 98%)    PHYSICAL EXAM:  GENERAL: NAD, well-groomed, well-developed  HEAD:  Atraumatic, Normocephalic  EYES: EOMI, PERRLA, conjunctiva and sclera clear  ENMT: No tonsillar erythema, exudates, or enlargement; Moist mucous membranes   NECK: Supple, No JVD   NERVOUS SYSTEM: Awake, oriented to herself, place, improved mental status, grand niece at bedside   CHEST/LUNG: Clear to auscultation  bilaterally; No rales, rhonchi, wheezing, or rubs  HEART: Regular rate and rhythm; No murmurs, rubs, or gallops  ABDOMEN: Soft, Nontender, Nondistended; Bowel sounds present  EXTREMITIES:  2+ Peripheral Pulses, No clubbing, cyanosis, or edema  LYMPH: No lymphadenopathy noted    LABS:    cardiac markers     CAPILLARY BLOOD GLUCOSE    Cultures    RADIOLOGY & ADDITIONAL TESTS:    Imaging Personally Reviewed:  [x ] YES  [ ] NO    Consultant(s) Notes Reviewed:  [x ] YES  [ ] NO    Care Discussed with Consultants/Other Providers [x ] YES  [ ] NO

## 2017-12-15 NOTE — DISCHARGE NOTE ADULT - CARE PLAN
Principal Discharge DX:	Acute delirium  Goal:	resolve  Instructions for follow-up, activity and diet:	monitor, follow up with your PCP  Secondary Diagnosis:	Elevated troponin  Instructions for follow-up, activity and diet:	stable,follow up with your PCP  Secondary Diagnosis:	Essential hypertension  Instructions for follow-up, activity and diet:	continue with current meds, follow up with your PCP

## 2017-12-15 NOTE — DISCHARGE NOTE ADULT - PLAN OF CARE
resolve monitor, follow up with your PCP stable,follow up with your PCP continue with current meds, follow up with your PCP

## 2017-12-15 NOTE — PROGRESS NOTE ADULT - PROBLEM SELECTOR PLAN 1
- CT head showed no abnormalities   - pt was prescribed Amitriptylline and xanax by her neurologist ( [phone]) .As per grand niece Nicollette (she is RN, and checks on pt frequently, last saw her well on friday evening) pt picked up amitriptylline (120 tabs)on friday and now has 104 pills in bottle  . likely unintentional overdose   ?? also she noted that pt has been forgetful lately and agrees for possiblility that pt might have unintentionally overdosed on amitriptylline   -Psych on board ,   will d/c to [facility]

## 2017-12-16 LAB
CULTURE RESULTS: SIGNIFICANT CHANGE UP
CULTURE RESULTS: SIGNIFICANT CHANGE UP
SPECIMEN SOURCE: SIGNIFICANT CHANGE UP
SPECIMEN SOURCE: SIGNIFICANT CHANGE UP

## 2017-12-16 PROCEDURE — 99232 SBSQ HOSP IP/OBS MODERATE 35: CPT

## 2017-12-16 RX ADMIN — Medication 81 MILLIGRAM(S): at 12:01

## 2017-12-16 RX ADMIN — ATENOLOL 50 MILLIGRAM(S): 25 TABLET ORAL at 05:41

## 2017-12-16 RX ADMIN — ENOXAPARIN SODIUM 30 MILLIGRAM(S): 100 INJECTION SUBCUTANEOUS at 11:52

## 2017-12-16 RX ADMIN — Medication 3 MILLIGRAM(S): at 21:29

## 2017-12-16 NOTE — PROGRESS NOTE ADULT - SUBJECTIVE AND OBJECTIVE BOX
Patient is a 89y old  Female who presents with a chief complaint of Altered Mental Status (15 Dec 2017 14:40)      INTERVAL HPI/OVERNIGHT EVENTS:  pt   seen    at   bed   side   MEDICATIONS  (STANDING):  aspirin enteric coated 81 milliGRAM(s) Oral daily  ATENolol  Tablet 50 milliGRAM(s) Oral daily  enoxaparin Injectable 30 milliGRAM(s) SubCutaneous daily  melatonin 3 milliGRAM(s) Oral <User Schedule>    MEDICATIONS  (PRN):      Allergies    No Known Allergies    Intolerances          Vital Signs Last 24 Hrs  T(C): 37.2 (16 Dec 2017 11:39), Max: 37.3 (15 Dec 2017 17:10)  T(F): 98.9 (16 Dec 2017 11:39), Max: 99.2 (15 Dec 2017 17:10)  HR: 65 (16 Dec 2017 11:39) (65 - 78)  BP: 151/66 (16 Dec 2017 11:39) (125/67 - 157/76)  BP(mean): --  RR: 18 (16 Dec 2017 11:39) (18 - 18)  SpO2: 97% (16 Dec 2017 11:39) (97% - 99%)    PHYSICAL EXAM:  pt  is   A XO X 3   Heart-   S1 , S2   + R/R/R  Lung-  B/L  air  entry   ABD-  soft , BS +  EXT-  No edema   LABS:              CAPILLARY BLOOD GLUCOSE        Lipid panel:           TSH     RADIOLOGY & ADDITIONAL TESTS:    Imaging Personally Reviewed:  [ ] YES  [ ] NO    Consultant(s) Notes Reviewed:  [ ] YES  [ ] NO    Care Discussed with Consultants/Other Providers [ ] YES  [ ] NO Patient is a 89y old  Female who presents with a chief complaint of Altered Mental Status (15 Dec 2017 14:40)      INTERVAL HPI/OVERNIGHT EVENTS:  pt   seen    at   bed   side   MEDICATIONS  (STANDING):  aspirin enteric coated 81 milliGRAM(s) Oral daily  ATENolol  Tablet 50 milliGRAM(s) Oral daily  enoxaparin Injectable 30 milliGRAM(s) SubCutaneous daily  melatonin 3 milliGRAM(s) Oral <User Schedule>    MEDICATIONS  (PRN):      Allergies    No Known Allergies    Intolerances          Vital Signs Last 24 Hrs  T(C): 37.2 (16 Dec 2017 11:39), Max: 37.3 (15 Dec 2017 17:10)  T(F): 98.9 (16 Dec 2017 11:39), Max: 99.2 (15 Dec 2017 17:10)  HR: 65 (16 Dec 2017 11:39) (65 - 78)  BP: 151/66 (16 Dec 2017 11:39) (125/67 - 157/76)  BP(mean): --  RR: 18 (16 Dec 2017 11:39) (18 - 18)  SpO2: 97% (16 Dec 2017 11:39) (97% - 99%)    PHYSICAL EXAM:  pt  is   Alert   &   Oriented  X 2   Heart-   S1 , S2   + R/R/R  Lung-  B/L  air  entry   ABD-  soft , BS +  EXT-  No edema   LABS:              CAPILLARY BLOOD GLUCOSE        Lipid panel:           TSH     RADIOLOGY & ADDITIONAL TESTS:    Imaging Personally Reviewed:  [ ] YES  [ ] NO    Consultant(s) Notes Reviewed:  [ ] YES  [ ] NO    Care Discussed with Consultants/Other Providers [ ] YES  [ ] NO

## 2017-12-17 LAB
ANION GAP SERPL CALC-SCNC: 8 MMOL/L — SIGNIFICANT CHANGE UP (ref 5–17)
BUN SERPL-MCNC: 23 MG/DL — SIGNIFICANT CHANGE UP (ref 7–23)
CALCIUM SERPL-MCNC: 8.8 MG/DL — SIGNIFICANT CHANGE UP (ref 8.5–10.1)
CHLORIDE SERPL-SCNC: 104 MMOL/L — SIGNIFICANT CHANGE UP (ref 96–108)
CO2 SERPL-SCNC: 28 MMOL/L — SIGNIFICANT CHANGE UP (ref 22–31)
CREAT SERPL-MCNC: 0.9 MG/DL — SIGNIFICANT CHANGE UP (ref 0.5–1.3)
GLUCOSE SERPL-MCNC: 103 MG/DL — HIGH (ref 70–99)
HCT VFR BLD CALC: 38.7 % — SIGNIFICANT CHANGE UP (ref 34.5–45)
HGB BLD-MCNC: 12.6 G/DL — SIGNIFICANT CHANGE UP (ref 11.5–15.5)
MCHC RBC-ENTMCNC: 32.6 GM/DL — SIGNIFICANT CHANGE UP (ref 32–36)
MCHC RBC-ENTMCNC: 33.2 PG — SIGNIFICANT CHANGE UP (ref 27–34)
MCV RBC AUTO: 101.8 FL — HIGH (ref 80–100)
PLATELET # BLD AUTO: 159 K/UL — SIGNIFICANT CHANGE UP (ref 150–400)
POTASSIUM SERPL-MCNC: 4.2 MMOL/L — SIGNIFICANT CHANGE UP (ref 3.5–5.3)
POTASSIUM SERPL-SCNC: 4.2 MMOL/L — SIGNIFICANT CHANGE UP (ref 3.5–5.3)
RBC # BLD: 3.8 M/UL — SIGNIFICANT CHANGE UP (ref 3.8–5.2)
RBC # FLD: 12.1 % — SIGNIFICANT CHANGE UP (ref 11–15)
SODIUM SERPL-SCNC: 140 MMOL/L — SIGNIFICANT CHANGE UP (ref 135–145)
TROPONIN I SERPL-MCNC: 0.14 NG/ML — HIGH (ref 0.01–0.04)
WBC # BLD: 8.1 K/UL — SIGNIFICANT CHANGE UP (ref 3.8–10.5)
WBC # FLD AUTO: 8.1 K/UL — SIGNIFICANT CHANGE UP (ref 3.8–10.5)

## 2017-12-17 PROCEDURE — 93010 ELECTROCARDIOGRAM REPORT: CPT

## 2017-12-17 PROCEDURE — 99232 SBSQ HOSP IP/OBS MODERATE 35: CPT

## 2017-12-17 RX ADMIN — Medication 81 MILLIGRAM(S): at 11:43

## 2017-12-17 RX ADMIN — ATENOLOL 50 MILLIGRAM(S): 25 TABLET ORAL at 05:22

## 2017-12-17 RX ADMIN — ENOXAPARIN SODIUM 30 MILLIGRAM(S): 100 INJECTION SUBCUTANEOUS at 11:43

## 2017-12-17 RX ADMIN — Medication 3 MILLIGRAM(S): at 21:40

## 2017-12-17 NOTE — PROGRESS NOTE ADULT - SUBJECTIVE AND OBJECTIVE BOX
Patient is a 89y Female with a known history of :  Cognitive disorder (F09)  Delirium due to another medical condition, acute, mixed level of activity (F05)  Elevated troponin (R74.8)  Acute delirium (R41.0)  Acute cystitis without hematuria (N30.00)  Essential hypertension (I10)  Metabolic encephalopathy (G93.41)    HPI:  88yo lady admitted with altered mental status after medication changes.  Now back to baseline.  Overnight with chest tightness during a time when /80.  No further episodes.  Trop 0.1 (was 0.2 prior).  All EKGs since 12/11/17 normal; EKG during event NSR 84bpm; this AM NSR 73bpm with LVH (unchanged).      REVIEW OF SYSTEMS:    CONSTITUTIONAL: No fever, weight loss, or fatigue  EYES: No eye pain, visual disturbances, or discharge  ENMT:  No difficulty hearing, tinnitus, vertigo; No sinus or throat pain  NECK: No pain or stiffness  BREASTS: No pain, masses, or nipple discharge  RESPIRATORY: No cough, wheezing, chills or hemoptysis; No shortness of breath  CARDIOVASCULAR: No chest pain, palpitations, dizziness, or leg swelling  GASTROINTESTINAL: No abdominal or epigastric pain. No nausea, vomiting, or hematemesis; No diarrhea or constipation. No melena or hematochezia.  GENITOURINARY: No dysuria, frequency, hematuria, or incontinence  NEUROLOGICAL: No headaches, memory loss, loss of strength, numbness, or tremors  SKIN: No itching, burning, rashes, or lesions   LYMPH NODES: No enlarged glands  ENDOCRINE: No heat or cold intolerance; No hair loss  MUSCULOSKELETAL: No joint pain or swelling; No muscle, back, or extremity pain  PSYCHIATRIC: No depression, anxiety, mood swings, or difficulty sleeping  HEME/LYMPH: No easy bruising, or bleeding gums  ALLERGY AND IMMUNOLOGIC: No hives or eczema    MEDICATIONS  (STANDING):  aspirin enteric coated 81 milliGRAM(s) Oral daily  ATENolol  Tablet 50 milliGRAM(s) Oral daily  enoxaparin Injectable 30 milliGRAM(s) SubCutaneous daily  melatonin 3 milliGRAM(s) Oral <User Schedule>    MEDICATIONS  (PRN):      ALLERGIES: No Known Allergies      FAMILY HISTORY:  No pertinent family history in first degree relatives      PHYSICAL EXAMINATION:  -----------------------------  T(C): 37.3 (12-17-17 @ 11:23), Max: 37.3 (12-17-17 @ 11:23)  HR: 75 (12-17-17 @ 11:23) (75 - 84)  BP: 131/62 (12-17-17 @ 11:23) (131/62 - 180/78)  RR: 17 (12-17-17 @ 11:23) (16 - 17)  SpO2: 95% (12-17-17 @ 11:23) (95% - 98%)  Wt(kg): --    12-16 @ 07:01  -  12-17 @ 07:00  --------------------------------------------------------  IN:    Oral Fluid: 920 mL  Total IN: 920 mL    OUT:    Voided: 600 mL  Total OUT: 600 mL    Total NET: 320 mL            Constitutional: well developed, normal appearance, well groomed, well nourished, no deformities and no acute distress.   Eyes: the conjunctiva exhibited no abnormalities and the eyelids demonstrated no xanthelasmas.   HEENT: normal oral mucosa, no oral pallor and no oral cyanosis.   Neck: normal jugular venous A waves present, normal jugular venous V waves present and no jugular venous hammonds A waves.   Pulmonary: no respiratory distress, normal respiratory rhythm and effort, no accessory muscle use and lungs were clear to auscultation bilaterally.   Cardiovascular: heart rate and rhythm were normal, normal S1 and S2 and no murmur, gallop, rub, heave or thrill are present.   Abdomen: soft, non-tender, no hepato-splenomegaly and no abdominal mass palpated.   Musculoskeletal: the gait could not be assessed..   Extremities: no clubbing of the fingernails, no localized cyanosis, no petechial hemorrhages and no ischemic changes.   Skin: normal skin color and pigmentation, no rash, no venous stasis, no skin lesions, no skin ulcer and no xanthoma was observed.   Psychiatric: oriented to person, place, and time, the affect was normal, the mood was normal and not feeling anxious.     LABS:   --------  12-17    140  |  104  |  23  ----------------------------<  103<H>  4.2   |  28  |  0.90    Ca    8.8      17 Dec 2017 07:41                           12.6   8.1   )-----------( 159      ( 17 Dec 2017 07:41 )             38.7         12-17  07:41 CPK total:--, CKMB --, Troponin I - .140 ng/mL<H>

## 2017-12-17 NOTE — PROGRESS NOTE ADULT - SUBJECTIVE AND OBJECTIVE BOX
Patient is a 89y old  Female who presents with a chief complaint of Altered Mental Status (15 Dec 2017 14:40)      INTERVAL HPI/OVERNIGHT EVENTS:  pt   seen   at  bed  side ,  denies   chest  pain  now   Last  night  event  noted  MEDICATIONS  (STANDING):  aspirin enteric coated 81 milliGRAM(s) Oral daily  ATENolol  Tablet 50 milliGRAM(s) Oral daily  enoxaparin Injectable 30 milliGRAM(s) SubCutaneous daily  melatonin 3 milliGRAM(s) Oral <User Schedule>    MEDICATIONS  (PRN):      Allergies    No Known Allergies    Intolerances          Vital Signs Last 24 Hrs  T(C): 37 (17 Dec 2017 05:45), Max: 37.2 (16 Dec 2017 11:39)  T(F): 98.6 (17 Dec 2017 05:45), Max: 99 (16 Dec 2017 17:30)  HR: 82 (17 Dec 2017 05:45) (65 - 84)  BP: 147/68 (17 Dec 2017 05:45) (144/70 - 180/78)  BP(mean): --  RR: 17 (17 Dec 2017 05:45) (16 - 18)  SpO2: 97% (17 Dec 2017 05:45) (97% - 98%)    PHYSICAL EXAM:  pt   is   A X O X 3   Heart   - S1 , S2   +  R/R/R  Lung-  B/L   air   entry   ABD-   Soft  , BS+  EXT -  NO   edema   LABS:                        12.6   8.1   )-----------( 159      ( 17 Dec 2017 07:41 )             38.7     12-17    140  |  104  |  23  ----------------------------<  103<H>  4.2   |  28  |  0.90    Ca    8.8      17 Dec 2017 07:41          CAPILLARY BLOOD GLUCOSE        Lipid panel:   CARDIAC MARKERS ( 17 Dec 2017 07:41 )  .140 ng/mL / x     / x     / x     / x              TSH     RADIOLOGY & ADDITIONAL TESTS:    Imaging Personally Reviewed:  [ ] YES  [ ] NO    Consultant(s) Notes Reviewed:  [ ] YES  [ ] NO    Care Discussed with Consultants/Other Providers [ ] YES  [ ] NO Patient is a 89y old  Female who presents with a chief complaint of Altered Mental Status (15 Dec 2017 14:40)      INTERVAL HPI/OVERNIGHT EVENTS:  pt   seen   at  bed  side ,  denies   chest  pain  now   Today  Morning   event  noted  MEDICATIONS  (STANDING):  aspirin enteric coated 81 milliGRAM(s) Oral daily  ATENolol  Tablet 50 milliGRAM(s) Oral daily  enoxaparin Injectable 30 milliGRAM(s) SubCutaneous daily  melatonin 3 milliGRAM(s) Oral <User Schedule>    MEDICATIONS  (PRN):      Allergies    No Known Allergies    Intolerances          Vital Signs Last 24 Hrs  T(C): 37 (17 Dec 2017 05:45), Max: 37.2 (16 Dec 2017 11:39)  T(F): 98.6 (17 Dec 2017 05:45), Max: 99 (16 Dec 2017 17:30)  HR: 82 (17 Dec 2017 05:45) (65 - 84)  BP: 147/68 (17 Dec 2017 05:45) (144/70 - 180/78)  BP(mean): --  RR: 17 (17 Dec 2017 05:45) (16 - 18)  SpO2: 97% (17 Dec 2017 05:45) (97% - 98%)    PHYSICAL EXAM:  pt   is   A X O X 3   Heart   - S1 , S2   +  R/R/R  Lung-  B/L   air   entry   ABD-   Soft  , BS+  EXT -  NO   edema   LABS:                        12.6   8.1   )-----------( 159      ( 17 Dec 2017 07:41 )             38.7     12-17    140  |  104  |  23  ----------------------------<  103<H>  4.2   |  28  |  0.90    Ca    8.8      17 Dec 2017 07:41          CAPILLARY BLOOD GLUCOSE        Lipid panel:   CARDIAC MARKERS ( 17 Dec 2017 07:41 )  .140 ng/mL / x     / x     / x     / x              TSH     RADIOLOGY & ADDITIONAL TESTS:    Imaging Personally Reviewed:  [ ] YES  [ ] NO    Consultant(s) Notes Reviewed:  [ ] YES  [ ] NO    Care Discussed with Consultants/Other Providers [ ] YES  [ ] NO Patient is a 89y old  Female who presents with a chief complaint of Altered Mental Status (15 Dec 2017 14:40)      INTERVAL HPI/OVERNIGHT EVENTS:  pt   seen   at  bed  side ,  denies   chest  pain  now   pt  wants   to   sign  DNR , pt  stats  she  has  DNR  Papers   at  home , D/W  Pt  in  detail , Answer  all  questions , pt  is   A XO X 3  & Understand  fully , pt   express   her  will   , pt   sign  DNR  Today  Morning   event  noted  MEDICATIONS  (STANDING):  aspirin enteric coated 81 milliGRAM(s) Oral daily  ATENolol  Tablet 50 milliGRAM(s) Oral daily  enoxaparin Injectable 30 milliGRAM(s) SubCutaneous daily  melatonin 3 milliGRAM(s) Oral <User Schedule>    MEDICATIONS  (PRN):      Allergies    No Known Allergies    Intolerances          Vital Signs Last 24 Hrs  T(C): 37 (17 Dec 2017 05:45), Max: 37.2 (16 Dec 2017 11:39)  T(F): 98.6 (17 Dec 2017 05:45), Max: 99 (16 Dec 2017 17:30)  HR: 82 (17 Dec 2017 05:45) (65 - 84)  BP: 147/68 (17 Dec 2017 05:45) (144/70 - 180/78)  BP(mean): --  RR: 17 (17 Dec 2017 05:45) (16 - 18)  SpO2: 97% (17 Dec 2017 05:45) (97% - 98%)    PHYSICAL EXAM:  pt   is   A X O X 3   Heart   - S1 , S2   +  R/R/R  Lung-  B/L   air   entry   ABD-   Soft  , BS+  EXT -  NO   edema   LABS:                        12.6   8.1   )-----------( 159      ( 17 Dec 2017 07:41 )             38.7     12-17    140  |  104  |  23  ----------------------------<  103<H>  4.2   |  28  |  0.90    Ca    8.8      17 Dec 2017 07:41          CAPILLARY BLOOD GLUCOSE        Lipid panel:   CARDIAC MARKERS ( 17 Dec 2017 07:41 )  .140 ng/mL / x     / x     / x     / x              TSH     RADIOLOGY & ADDITIONAL TESTS:    Imaging Personally Reviewed:  [ ] YES  [ ] NO    Consultant(s) Notes Reviewed:  [ ] YES  [ ] NO    Care Discussed with Consultants/Other Providers [ ] YES  [ ] NO

## 2017-12-17 NOTE — CHART NOTE - NSCHARTNOTEFT_GEN_A_CORE
Patient is a 89y old  Female who presents with a chief complaint of Altered Mental Status (15 Dec 2017 14:40)    Called by RN to evaluate pt. with c/o left chest tightness radiating across chest, Denies SOB, N/V, aplpitation  Vital Signs Last 24 Hrs  T(C): 37.1 (17 Dec 2017 00:33), Max: 37.2 (16 Dec 2017 11:39)  T(F): 98.7 (17 Dec 2017 00:33), Max: 99 (16 Dec 2017 17:30)  HR: 84 (17 Dec 2017 00:33) (65 - 84)  BP: 180/78 (17 Dec 2017 00:33) (133/57 - 180/78)  BP(mean): --  RR: 17 (17 Dec 2017 00:33) (16 - 18)  SpO2: 97% (17 Dec 2017 00:33) (97% - 98%)    aspirin enteric coated 81 milliGRAM(s) Oral daily  ATENolol  Tablet 50 milliGRAM(s) Oral daily  enoxaparin Injectable 30 milliGRAM(s) SubCutaneous daily  melatonin 3 milliGRAM(s) Oral <User Schedule>          PHYSICAL EXAM:  GENERAL: NAD  NERVOUS SYSTEM:  Alert & Oriented X3, Good concentration; Motor Strength 5/5 B/L upper and lower extremities  CHEST/LUNG: Clear to percussion bilaterally; No rales, rhonchi, wheezing, or rubs  HEART: Regular rate and rhythm  ABDOMEN: Soft, Nontender, Nondistended; Bowel sounds present  EXTREMITIES:  2+ Peripheral Pulses, No clubbing, cyanosis, or edema    HPI: 88y/o F w/h/o HTN was BIB EMS for confusion. Pt was found by the tenants to be confused , banging pots. + Trop on admission. Cardiology consulted. Trop most likely due to demand ischemia. Tele was d/c. Now pt C/O of left sided CP radiating across chest at rest not associated with SOB or palpitation.  EKG : NSR with no diagnostic changes from prior    Plan:  - Continue Atenolol and ASA  - Trop, CBC, BMP  - Resume Tele  -Further management per primary team

## 2017-12-18 VITALS
DIASTOLIC BLOOD PRESSURE: 74 MMHG | TEMPERATURE: 99 F | OXYGEN SATURATION: 95 % | RESPIRATION RATE: 18 BRPM | HEART RATE: 73 BPM | SYSTOLIC BLOOD PRESSURE: 134 MMHG

## 2017-12-18 DIAGNOSIS — E43 UNSPECIFIED SEVERE PROTEIN-CALORIE MALNUTRITION: ICD-10-CM

## 2017-12-18 PROCEDURE — 99239 HOSP IP/OBS DSCHRG MGMT >30: CPT

## 2017-12-18 RX ADMIN — ATENOLOL 50 MILLIGRAM(S): 25 TABLET ORAL at 05:18

## 2017-12-18 RX ADMIN — Medication 81 MILLIGRAM(S): at 11:12

## 2017-12-18 RX ADMIN — ENOXAPARIN SODIUM 30 MILLIGRAM(S): 100 INJECTION SUBCUTANEOUS at 11:12

## 2017-12-18 NOTE — DIETITIAN INITIAL EVALUATION ADULT. - ENERGY NEEDS
Height (cm): 154.94 (12-12)  Weight (kg): 49.1 (12-18)  BMI (kg/m2): 20.5 (12-18)  Ideal Body Weight: 47.7 kg +/-10%  % Ideal Body Weight: 100%

## 2017-12-18 NOTE — DIETITIAN INITIAL EVALUATION ADULT. - PHYSICAL APPEARANCE
well nourished/BMI 20.5; Nutrition focused physical exam conducted ; found signs of malnutrition [ ]absent [x ]present.  Subcutaneous fat loss: [moderate] Orbital fat pads region, [none ]Buccal fat region, [severe ]Triceps region,  [moderate ]Ribs region.  Muscle wasting: [severe ]Temples region, [moderate ]Clavicle region, [moderate ]Shoulder region, [moderate ]Scapula region, [moderate ]Interosseous region,  [moderate ]thigh region, [moderate ]Calf region/underweight

## 2017-12-18 NOTE — PROGRESS NOTE ADULT - PROBLEM SELECTOR PLAN 3
- monitor BP  - c/w home meds Atenolol

## 2017-12-18 NOTE — PROGRESS NOTE ADULT - PROBLEM SELECTOR PROBLEM 1
Elevated troponin
Elevated troponin
Acute delirium

## 2017-12-18 NOTE — PROGRESS NOTE ADULT - PROBLEM SELECTOR PROBLEM 2
Elevated troponin
Essential hypertension
Essential hypertension
Elevated troponin

## 2017-12-18 NOTE — DIETITIAN INITIAL EVALUATION ADULT. - OTHER INFO
pt seen due to LOS. pt presents c good appetite and po intake. breakfast tray observed; consumed 100%. as per flowsheet, po intake % since admission. pt reports she has no idea how she lost weight, appetite hasn't changed. full dentures; no difficulty chewing/swallowing. wt stable x 1 wk in hosp.

## 2017-12-18 NOTE — PROGRESS NOTE ADULT - ASSESSMENT
88y/o F w/h/o HTN was BIB EMS for confusion. Pt was found by the tenants to be confused , banging pots.
88y/o F w/h/o HTN was BIB EMS for confusion. Pt was found by the tenants to be confused , banging pots.
89y/o F w/h/o HTN a/w metabolic enceph due to medication
89yo lady admitted with altered mental status after medication changes.  Now back to baseline.  Overnight with chest tightness during a time when /80.  No further episodes.  Trop 0.1 (was 0.2 prior).  All EKGs since 12/11/17 normal; EKG during event NSR 84bpm; this AM NSR 73bpm with LVH (unchanged).    Elevated troponin   Acute delirium  Acute cystitis without hematuria  Essential hypertension  Metabolic encephalopathy    -mild troponin leak likely secondary to uncontrolled HTN and NOT ACS (during event, trop less than on admit and EKG normal)  -aim to keep SBP <150  -supportive care  -cont asa/atenolol  -ischemic cardiac evaluation discussed at length with patient; she wished to continue conservative treatments and is currently DNR.  We can add Imdur 30mg daily if needed (if she has further episodes of chest tightness).  If sx do not resolve with Imdur, will then re-consider stress testing.  -will sign off for now; please call with any further questions.
88y/o F w/h/o HTN was BIB EMS for confusion. Pt was found by the tenants to be confused , banging pots.
Plan to  D/C   [facility]   waiting   for  bed
89y/o F w/h/o HTN was BIB EMS for confusion. Pt was found by the tenants to be confused , banging pots.

## 2017-12-18 NOTE — CHART NOTE - NSCHARTNOTEFT_GEN_A_CORE
Upon Nutritional Assessment by the Registered Dietitian your patient was determined to meet criteria / has evidence of the following diagnosis/diagnoses:          [ ]  Mild Protein Calorie Malnutrition        [ ]  Moderate Protein Calorie Malnutrition        [x ] Severe Protein Calorie Malnutrition        [ ] Unspecified Protein Calorie Malnutrition        [ ] Underweight / BMI <19        [ ] Morbid Obesity / BMI > 40      Findings as based on:  •  Comprehensive nutrition assessment and consultation  •  Calorie counts (nutrient intake analysis)  •  Food acceptance and intake status from observations by staff  •  Follow up  •  Patient education  •  Intervention secondary to interdisciplinary rounds  •   concerns      Treatment:    The following diet has been recommended:  Regular; low Na; Ensure Enlivex2 (provides 700 kcals, 40g pro)     PROVIDER Section:     By signing this assessment you are acknowledging and agree with the diagnosis/diagnoses assigned by the Registered Dietitian    Comments:

## 2017-12-18 NOTE — DIETITIAN INITIAL EVALUATION ADULT. - PERTINENT LABORATORY DATA
12-17 Na140 mmol/L Glu 103 mg/dL<H> K+ 4.2 mmol/L Cr  0.90 mg/dL BUN 23 mg/dL Phos n/a   Alb n/a   PAB n/a

## 2017-12-21 DIAGNOSIS — G56.00 CARPAL TUNNEL SYNDROME, UNSPECIFIED UPPER LIMB: ICD-10-CM

## 2017-12-21 DIAGNOSIS — G92 TOXIC ENCEPHALOPATHY: ICD-10-CM

## 2017-12-21 DIAGNOSIS — T43.011A POISONING BY TRICYCLIC ANTIDEPRESSANTS, ACCIDENTAL (UNINTENTIONAL), INITIAL ENCOUNTER: ICD-10-CM

## 2017-12-21 DIAGNOSIS — I10 ESSENTIAL (PRIMARY) HYPERTENSION: ICD-10-CM

## 2017-12-21 DIAGNOSIS — F41.9 ANXIETY DISORDER, UNSPECIFIED: ICD-10-CM

## 2017-12-21 DIAGNOSIS — I24.8 OTHER FORMS OF ACUTE ISCHEMIC HEART DISEASE: ICD-10-CM

## 2017-12-21 DIAGNOSIS — M54.12 RADICULOPATHY, CERVICAL REGION: ICD-10-CM

## 2017-12-21 DIAGNOSIS — F05 DELIRIUM DUE TO KNOWN PHYSIOLOGICAL CONDITION: ICD-10-CM

## 2017-12-21 DIAGNOSIS — E43 UNSPECIFIED SEVERE PROTEIN-CALORIE MALNUTRITION: ICD-10-CM

## 2022-10-17 NOTE — PHYSICAL THERAPY INITIAL EVALUATION ADULT - PHYSICAL ASSIST/NONPHYSICAL ASSIST: SIT/STAND, REHAB EVAL
Clinic Care Coordination Contact    Clinic Care Coordination Contact  OUTREACH    Referral Information:  Referral Source: PCP    Primary Diagnosis: Psychosocial    Chief Complaint   Patient presents with     Clinic Care Coordination - Initial     SW        Universal Utilization: CC called out to patient and his wife for the initial assessment.   Clinic Utilization  Difficulty keeping appointments:: No  Compliance Concerns: No  No-Show Concerns: No  No PCP office visit in Past Year: Yes  Utilization    Hospital Admissions  0             ED Visits  0             No Show Count (past year)  0                Current as of: 10/17/2022  1:34 AM              Clinical Concerns:  Current Medical Concerns:    Patient Active Problem List   Diagnosis     Anxiety     Bipolar affective disorder in remission (H)     Allergic rhinitis     Cervicalgia     Type 2 diabetes mellitus with stage 3b chronic kidney disease, with long-term current use of insulin (H)     HYPERLIPIDEMIA LDL GOAL <100     Insomnia     Hypertension goal BP (blood pressure) < 130/80     COPD (chronic obstructive pulmonary disease) (H)     GERD (gastroesophageal reflux disease)     Peripheral neuralgia     Microalbuminuria     Vitamin B12 deficiency without anemia     Vitamin D deficiency     Coronary artery disease involving coronary bypass graft of native heart without angina pectoris     Chronic pain     Ischemic cardiomyopathy     Chronic systolic heart failure (H)     Health Care Home     Diabetic polyneuropathy associated with type 2 diabetes mellitus (H)     CKD (chronic kidney disease) stage 3, GFR 30-59 ml/min (H)       Current Behavioral Concerns: Patient is struggling with remembering and has been argumentative about his memory. Patient and wife had an argument during the assessment regarding where his medications are kept.      Education Provided to patient: Role of CC provided to patient and his wife.    Pain  Pain (GOAL):: Yes  Type: Chronic  (>3mo)  Location of chronic pain:: various  Radiating: Yes  Location pain radiates to: feet  back  neck hand  Progression: Constant  Description of pain: Nagging, Numb  Chronic pain severity:: 7  Limitation of routine activities due to chronic pain:: Yes  Description: Unable to perform most daily activities (chores, hobbies, social activities, driving)  Alleviating Factors: Heat, Pain Medication  Aggravating Factors: Inactivity, Positioning  Health Maintenance Reviewed:    Clinical Pathway: None    Medication Management:  Medication review status: Medications reviewed and no changes reported per patient.           Functional Status:  Dependent ADLs:: Bathing, Eating, Grooming, Incontinence, Toileting  Dependent IADLs:: Medication Management, Incontinence  Bed or wheelchair confined:: No  Mobility Status: Independent    Living Situation:  Current living arrangement:: I live in a private home with family  [facility]    Lifestyle & Psychosocial Needs:    Social Determinants of Health     Tobacco Use: Medium Risk     Smoking Tobacco Use: Former     Smokeless Tobacco Use: Never     Passive Exposure: Not on file   Alcohol Use: Not At Risk     Frequency of Alcohol Consumption: 2-3 times a week     Average Number of Drinks: 1 or 2     Frequency of Binge Drinking: Never   Financial Resource Strain: Low Risk      Difficulty of Paying Living Expenses: Not very hard   Food Insecurity: No Food Insecurity     Worried About Running Out of Food in the Last Year: Never true     Ran Out of Food in the Last Year: Never true   Transportation Needs: No Transportation Needs     Lack of Transportation (Medical): No     Lack of Transportation (Non-Medical): No   Physical Activity: Insufficiently Active     Days of Exercise per Week: 2 days     Minutes of Exercise per Session: 10 min   Stress: No Stress Concern Present     Feeling of Stress : Only a little   Social Connections: Socially Isolated     Frequency  of Communication with Friends and Family: Never     Frequency of Social Gatherings with Friends and Family: Once a week     Attends Nondenominational Services: Never     Active Member of Clubs or Organizations: No     Attends Club or Organization Meetings: Not on file     Marital Status:    Intimate Partner Violence: Not on file   Depression: At risk     PHQ-2 Score: 3   Housing Stability: Low Risk      Unable to Pay for Housing in the Last Year: No     Number of Places Lived in the Last Year: 1     Unstable Housing in the Last Year: No     Diet:: Regular  Inadequate nutrition (GOAL):: Yes  Tube Feeding: No  Inadequate activity/exercise (GOAL):: Yes  Significant changes in sleep pattern (GOAL): Yes  Transportation means:: Family     Nondenominational or spiritual beliefs that impact treatment:: No  Mental health DX:: Yes  Mental health DX how managed:: None  Informal Support system:: Family       Patient is struggling to take his insulin and medications on time    Shares he will forget to take them in the afternoon and will take them before bed.  Patient has been going to bed around 2-3 in the morning    [facility] inquired if they had any reminders set up for him to take his medications and he does not.    Patient and wife share his sleep schedule is an issue and that he might not hear an alarm go off to remind him to take his meds    [facility] inquired about reaching out to the county. Patient's wife shares they will not qualify based on their financies    [facility] shared that for the service they are looking for they may have to pay out of pocket for     [facility] inquired about equipment needed. The family is not sure what could be helpful at this time.    Patient does not have a bath chair and does not know if one will work for him.     [facility] recommended reaching out to a mdical supply company to see what is available and determine what works for them    [facility] suggested at that point they reach out to PCP to request a prescription so that  the item may go through insurance.     Patient and wife began an argument over the phone about where they keep his medications    [facility] will send a message to PCP on patient's behalf regarding his insulin    [facility] received a call back from patient's wife. She shared immediately upon getting off the phone patient was confused about the time of day    She shares he has a family history of dementia    She shares that she has support through a caregiver support line but that it is hard    Patient's wife toured a memory care facility but shares it will cost around 10,000 dollars a month which they cannot afford.    Patient's wife and [facility] discussed calling the senior linkage line to learn more about patient's benefits and how they might be able to provide some coverage     Resources and Interventions:  Current Resources:      Community Resources: None  Supplies Currently Used at Home: Incontinence Supplies, Diabetic Supplies, Nutritional Supplements, Nebulizer tubing  Equipment Currently Used at Home: none  Employment Status: retired     [employer] [facility] [employer]  [phone]    [employer]  [phone]     Care Plan:  Care Plan: Help At Home     Problem: Insufficient In-home support     Goal: Establish adequate home support     Start Date: 10/17/2022 Expected End Date: 1/17/2023    Note:     Barriers: lack of access to [facility]  Strengths: good family support  Patient expressed understanding of goal: Yes  Action steps to achieve this goal:  1. I will reach out to [employer] [facility] regarding benefits and SNF/Assisted Living Options  2. I will obtain equipment to help me be safe at home and reduce the risk of falling  3. I will keep in touch with care coordination                        Patient/Caregiver understanding: The patient indicates understanding of these issues and agrees with the plan.    Outreach Frequency: monthly  Future Appointments              In 6 months Yasmine Warner, PhD [facility]  [facility]          Plan: CHW Delegation:  CHW to follow up on resources and goal  1)? Due Date:?  One month    CHW will involve Lead CC as needed or if patient is ready to move to maintenance.? Lead CC will continue to monitor CHW s monthly outreaches and progress to goal(s) every 6 weeks    SE[facility] [place]   [facility] - Clinic Care Coordination  [phone]     2 person assist

## 2022-10-23 ENCOUNTER — INPATIENT (INPATIENT)
Facility: HOSPITAL | Age: 87
LOS: 7 days | Discharge: NURSING, REHAB OR LONG-TERM CARE | End: 2022-10-31
Attending: INTERNAL MEDICINE | Admitting: INTERNAL MEDICINE

## 2022-10-23 VITALS
HEART RATE: 150 BPM | HEIGHT: 61 IN | WEIGHT: 117.07 LBS | TEMPERATURE: 99 F | OXYGEN SATURATION: 97 % | SYSTOLIC BLOOD PRESSURE: 148 MMHG | DIASTOLIC BLOOD PRESSURE: 79 MMHG | RESPIRATION RATE: 17 BRPM

## 2022-10-23 DIAGNOSIS — I95.9 HYPOTENSION, UNSPECIFIED: ICD-10-CM

## 2022-10-23 DIAGNOSIS — Z53.20 PROCEDURE AND TREATMENT NOT CARRIED OUT BECAUSE OF PATIENT'S DECISION FOR UNSPECIFIED REASONS: ICD-10-CM

## 2022-10-23 DIAGNOSIS — G47.00 INSOMNIA, UNSPECIFIED: ICD-10-CM

## 2022-10-23 DIAGNOSIS — I10 ESSENTIAL (PRIMARY) HYPERTENSION: ICD-10-CM

## 2022-10-23 DIAGNOSIS — Z66 DO NOT RESUSCITATE: ICD-10-CM

## 2022-10-23 DIAGNOSIS — I48.91 UNSPECIFIED ATRIAL FIBRILLATION: ICD-10-CM

## 2022-10-23 DIAGNOSIS — I21.A1 MYOCARDIAL INFARCTION TYPE 2: ICD-10-CM

## 2022-10-23 DIAGNOSIS — F41.9 ANXIETY DISORDER, UNSPECIFIED: ICD-10-CM

## 2022-10-23 DIAGNOSIS — E44.0 MODERATE PROTEIN-CALORIE MALNUTRITION: ICD-10-CM

## 2022-10-23 LAB
ALBUMIN SERPL ELPH-MCNC: 3.8 G/DL — SIGNIFICANT CHANGE UP (ref 3.3–5)
ALP SERPL-CCNC: 78 U/L — SIGNIFICANT CHANGE UP (ref 40–120)
ALT FLD-CCNC: 84 U/L — HIGH (ref 12–78)
AMPHET UR-MCNC: NEGATIVE — SIGNIFICANT CHANGE UP
ANION GAP SERPL CALC-SCNC: 8 MMOL/L — SIGNIFICANT CHANGE UP (ref 5–17)
APPEARANCE UR: CLEAR — SIGNIFICANT CHANGE UP
APTT BLD: 153.1 SEC — CRITICAL HIGH (ref 27.5–35.5)
APTT BLD: 30 SEC — SIGNIFICANT CHANGE UP (ref 27.5–35.5)
AST SERPL-CCNC: 53 U/L — HIGH (ref 15–37)
BACTERIA # UR AUTO: ABNORMAL
BARBITURATES UR SCN-MCNC: NEGATIVE — SIGNIFICANT CHANGE UP
BASOPHILS # BLD AUTO: 0.03 K/UL — SIGNIFICANT CHANGE UP (ref 0–0.2)
BASOPHILS NFR BLD AUTO: 0.4 % — SIGNIFICANT CHANGE UP (ref 0–2)
BENZODIAZ UR-MCNC: NEGATIVE — SIGNIFICANT CHANGE UP
BILIRUB SERPL-MCNC: 0.7 MG/DL — SIGNIFICANT CHANGE UP (ref 0.2–1.2)
BILIRUB UR-MCNC: NEGATIVE — SIGNIFICANT CHANGE UP
BUN SERPL-MCNC: 24 MG/DL — HIGH (ref 7–23)
CALCIUM SERPL-MCNC: 9.3 MG/DL — SIGNIFICANT CHANGE UP (ref 8.5–10.1)
CHLORIDE SERPL-SCNC: 109 MMOL/L — HIGH (ref 96–108)
CO2 SERPL-SCNC: 24 MMOL/L — SIGNIFICANT CHANGE UP (ref 22–31)
COCAINE METAB.OTHER UR-MCNC: NEGATIVE — SIGNIFICANT CHANGE UP
COLOR SPEC: YELLOW — SIGNIFICANT CHANGE UP
CREAT SERPL-MCNC: 1.29 MG/DL — SIGNIFICANT CHANGE UP (ref 0.5–1.3)
DIFF PNL FLD: NEGATIVE — SIGNIFICANT CHANGE UP
EGFR: 38 ML/MIN/1.73M2 — LOW
EOSINOPHIL # BLD AUTO: 0.03 K/UL — SIGNIFICANT CHANGE UP (ref 0–0.5)
EOSINOPHIL NFR BLD AUTO: 0.4 % — SIGNIFICANT CHANGE UP (ref 0–6)
EPI CELLS # UR: SIGNIFICANT CHANGE UP
FLUAV AG NPH QL: SIGNIFICANT CHANGE UP
FLUBV AG NPH QL: SIGNIFICANT CHANGE UP
GLUCOSE SERPL-MCNC: 224 MG/DL — HIGH (ref 70–99)
GLUCOSE UR QL: NEGATIVE MG/DL — SIGNIFICANT CHANGE UP
HCT VFR BLD CALC: 32.9 % — LOW (ref 34.5–45)
HCT VFR BLD CALC: 35.3 % — SIGNIFICANT CHANGE UP (ref 34.5–45)
HGB BLD-MCNC: 10.6 G/DL — LOW (ref 11.5–15.5)
HGB BLD-MCNC: 11.6 G/DL — SIGNIFICANT CHANGE UP (ref 11.5–15.5)
IMM GRANULOCYTES NFR BLD AUTO: 0.4 % — SIGNIFICANT CHANGE UP (ref 0–0.9)
INR BLD: 1.16 RATIO — SIGNIFICANT CHANGE UP (ref 0.88–1.16)
KETONES UR-MCNC: NEGATIVE — SIGNIFICANT CHANGE UP
LACTATE SERPL-SCNC: 1.8 MMOL/L — SIGNIFICANT CHANGE UP (ref 0.7–2)
LACTATE SERPL-SCNC: 3.1 MMOL/L — HIGH (ref 0.7–2)
LACTATE SERPL-SCNC: 3.9 MMOL/L — HIGH (ref 0.7–2)
LEUKOCYTE ESTERASE UR-ACNC: ABNORMAL
LYMPHOCYTES # BLD AUTO: 1.3 K/UL — SIGNIFICANT CHANGE UP (ref 1–3.3)
LYMPHOCYTES # BLD AUTO: 16.8 % — SIGNIFICANT CHANGE UP (ref 13–44)
MAGNESIUM SERPL-MCNC: 2 MG/DL — SIGNIFICANT CHANGE UP (ref 1.6–2.6)
MAGNESIUM SERPL-MCNC: 2 MG/DL — SIGNIFICANT CHANGE UP (ref 1.6–2.6)
MCHC RBC-ENTMCNC: 32.2 G/DL — SIGNIFICANT CHANGE UP (ref 32–36)
MCHC RBC-ENTMCNC: 32.9 G/DL — SIGNIFICANT CHANGE UP (ref 32–36)
MCHC RBC-ENTMCNC: 33 PG — SIGNIFICANT CHANGE UP (ref 27–34)
MCHC RBC-ENTMCNC: 33.1 PG — SIGNIFICANT CHANGE UP (ref 27–34)
MCV RBC AUTO: 100.3 FL — HIGH (ref 80–100)
MCV RBC AUTO: 102.8 FL — HIGH (ref 80–100)
METHADONE UR-MCNC: NEGATIVE — SIGNIFICANT CHANGE UP
MONOCYTES # BLD AUTO: 0.57 K/UL — SIGNIFICANT CHANGE UP (ref 0–0.9)
MONOCYTES NFR BLD AUTO: 7.4 % — SIGNIFICANT CHANGE UP (ref 2–14)
NEUTROPHILS # BLD AUTO: 5.78 K/UL — SIGNIFICANT CHANGE UP (ref 1.8–7.4)
NEUTROPHILS NFR BLD AUTO: 74.6 % — SIGNIFICANT CHANGE UP (ref 43–77)
NITRITE UR-MCNC: NEGATIVE — SIGNIFICANT CHANGE UP
NRBC # BLD: 0 /100 WBCS — SIGNIFICANT CHANGE UP (ref 0–0)
NRBC # BLD: 0 /100 WBCS — SIGNIFICANT CHANGE UP (ref 0–0)
OPIATES UR-MCNC: NEGATIVE — SIGNIFICANT CHANGE UP
PCP SPEC-MCNC: SIGNIFICANT CHANGE UP
PCP UR-MCNC: NEGATIVE — SIGNIFICANT CHANGE UP
PH UR: 5 — SIGNIFICANT CHANGE UP (ref 5–8)
PLATELET # BLD AUTO: 112 K/UL — LOW (ref 150–400)
PLATELET # BLD AUTO: 136 K/UL — LOW (ref 150–400)
POTASSIUM SERPL-MCNC: 4.2 MMOL/L — SIGNIFICANT CHANGE UP (ref 3.5–5.3)
POTASSIUM SERPL-SCNC: 4.2 MMOL/L — SIGNIFICANT CHANGE UP (ref 3.5–5.3)
PROT SERPL-MCNC: 7.3 GM/DL — SIGNIFICANT CHANGE UP (ref 6–8.3)
PROT UR-MCNC: 30 MG/DL
PROTHROM AB SERPL-ACNC: 14 SEC — HIGH (ref 10.5–13.4)
RBC # BLD: 3.2 M/UL — LOW (ref 3.8–5.2)
RBC # BLD: 3.52 M/UL — LOW (ref 3.8–5.2)
RBC # FLD: 13.9 % — SIGNIFICANT CHANGE UP (ref 10.3–14.5)
RBC # FLD: 13.9 % — SIGNIFICANT CHANGE UP (ref 10.3–14.5)
RBC CASTS # UR COMP ASSIST: NEGATIVE /HPF — SIGNIFICANT CHANGE UP (ref 0–4)
SARS-COV-2 RNA SPEC QL NAA+PROBE: SIGNIFICANT CHANGE UP
SODIUM SERPL-SCNC: 141 MMOL/L — SIGNIFICANT CHANGE UP (ref 135–145)
SP GR SPEC: 1.01 — SIGNIFICANT CHANGE UP (ref 1.01–1.02)
THC UR QL: NEGATIVE — SIGNIFICANT CHANGE UP
TROPONIN I, HIGH SENSITIVITY RESULT: 106 NG/L — HIGH
TSH SERPL-MCNC: 0.86 UIU/ML — SIGNIFICANT CHANGE UP (ref 0.36–3.74)
UROBILINOGEN FLD QL: NEGATIVE MG/DL — SIGNIFICANT CHANGE UP
WBC # BLD: 6.58 K/UL — SIGNIFICANT CHANGE UP (ref 3.8–10.5)
WBC # BLD: 7.74 K/UL — SIGNIFICANT CHANGE UP (ref 3.8–10.5)
WBC # FLD AUTO: 6.58 K/UL — SIGNIFICANT CHANGE UP (ref 3.8–10.5)
WBC # FLD AUTO: 7.74 K/UL — SIGNIFICANT CHANGE UP (ref 3.8–10.5)
WBC UR QL: SIGNIFICANT CHANGE UP

## 2022-10-23 PROCEDURE — 71045 X-RAY EXAM CHEST 1 VIEW: CPT | Mod: 26

## 2022-10-23 PROCEDURE — 70450 CT HEAD/BRAIN W/O DYE: CPT | Mod: 26,MA

## 2022-10-23 PROCEDURE — 99285 EMERGENCY DEPT VISIT HI MDM: CPT

## 2022-10-23 PROCEDURE — 93010 ELECTROCARDIOGRAM REPORT: CPT

## 2022-10-23 RX ORDER — LANOLIN ALCOHOL/MO/W.PET/CERES
3 CREAM (GRAM) TOPICAL AT BEDTIME
Refills: 0 | Status: DISCONTINUED | OUTPATIENT
Start: 2022-10-23 | End: 2022-10-31

## 2022-10-23 RX ORDER — PIPERACILLIN AND TAZOBACTAM 4; .5 G/20ML; G/20ML
3.38 INJECTION, POWDER, LYOPHILIZED, FOR SOLUTION INTRAVENOUS ONCE
Refills: 0 | Status: COMPLETED | OUTPATIENT
Start: 2022-10-23 | End: 2022-10-23

## 2022-10-23 RX ORDER — METOPROLOL TARTRATE 50 MG
25 TABLET ORAL EVERY 12 HOURS
Refills: 0 | Status: DISCONTINUED | OUTPATIENT
Start: 2022-10-23 | End: 2022-10-24

## 2022-10-23 RX ORDER — METOPROLOL TARTRATE 50 MG
2.5 TABLET ORAL ONCE
Refills: 0 | Status: COMPLETED | OUTPATIENT
Start: 2022-10-23 | End: 2022-10-23

## 2022-10-23 RX ORDER — ASPIRIN/CALCIUM CARB/MAGNESIUM 324 MG
324 TABLET ORAL ONCE
Refills: 0 | Status: COMPLETED | OUTPATIENT
Start: 2022-10-23 | End: 2022-10-23

## 2022-10-23 RX ORDER — HEPARIN SODIUM 5000 [USP'U]/ML
INJECTION INTRAVENOUS; SUBCUTANEOUS
Qty: 25000 | Refills: 0 | Status: DISCONTINUED | OUTPATIENT
Start: 2022-10-23 | End: 2022-10-24

## 2022-10-23 RX ORDER — VANCOMYCIN HCL 1 G
1000 VIAL (EA) INTRAVENOUS ONCE
Refills: 0 | Status: COMPLETED | OUTPATIENT
Start: 2022-10-23 | End: 2022-10-23

## 2022-10-23 RX ORDER — PANTOPRAZOLE SODIUM 20 MG/1
40 TABLET, DELAYED RELEASE ORAL ONCE
Refills: 0 | Status: COMPLETED | OUTPATIENT
Start: 2022-10-23 | End: 2022-10-23

## 2022-10-23 RX ORDER — HEPARIN SODIUM 5000 [USP'U]/ML
3200 INJECTION INTRAVENOUS; SUBCUTANEOUS ONCE
Refills: 0 | Status: COMPLETED | OUTPATIENT
Start: 2022-10-23 | End: 2022-10-23

## 2022-10-23 RX ORDER — SODIUM CHLORIDE 9 MG/ML
1650 INJECTION INTRAMUSCULAR; INTRAVENOUS; SUBCUTANEOUS ONCE
Refills: 0 | Status: COMPLETED | OUTPATIENT
Start: 2022-10-23 | End: 2022-10-23

## 2022-10-23 RX ORDER — HEPARIN SODIUM 5000 [USP'U]/ML
3200 INJECTION INTRAVENOUS; SUBCUTANEOUS EVERY 6 HOURS
Refills: 0 | Status: DISCONTINUED | OUTPATIENT
Start: 2022-10-23 | End: 2022-10-24

## 2022-10-23 RX ORDER — DILTIAZEM HCL 120 MG
10 CAPSULE, EXT RELEASE 24 HR ORAL ONCE
Refills: 0 | Status: COMPLETED | OUTPATIENT
Start: 2022-10-23 | End: 2022-10-23

## 2022-10-23 RX ADMIN — HEPARIN SODIUM 3200 UNIT(S): 5000 INJECTION INTRAVENOUS; SUBCUTANEOUS at 14:26

## 2022-10-23 RX ADMIN — SODIUM CHLORIDE 1650 MILLILITER(S): 9 INJECTION INTRAMUSCULAR; INTRAVENOUS; SUBCUTANEOUS at 10:40

## 2022-10-23 RX ADMIN — Medication 250 MILLIGRAM(S): at 11:31

## 2022-10-23 RX ADMIN — HEPARIN SODIUM 650 UNIT(S)/HR: 5000 INJECTION INTRAVENOUS; SUBCUTANEOUS at 14:57

## 2022-10-23 RX ADMIN — HEPARIN SODIUM 3200 UNIT(S): 5000 INJECTION INTRAVENOUS; SUBCUTANEOUS at 14:50

## 2022-10-23 RX ADMIN — PIPERACILLIN AND TAZOBACTAM 200 GRAM(S): 4; .5 INJECTION, POWDER, LYOPHILIZED, FOR SOLUTION INTRAVENOUS at 10:43

## 2022-10-23 RX ADMIN — Medication 324 MILLIGRAM(S): at 13:01

## 2022-10-23 RX ADMIN — HEPARIN SODIUM 0 UNIT(S)/HR: 5000 INJECTION INTRAVENOUS; SUBCUTANEOUS at 21:39

## 2022-10-23 RX ADMIN — HEPARIN SODIUM 0 UNIT(S)/HR: 5000 INJECTION INTRAVENOUS; SUBCUTANEOUS at 22:40

## 2022-10-23 RX ADMIN — Medication 10 MILLIGRAM(S): at 10:40

## 2022-10-23 RX ADMIN — PIPERACILLIN AND TAZOBACTAM 3.38 GRAM(S): 4; .5 INJECTION, POWDER, LYOPHILIZED, FOR SOLUTION INTRAVENOUS at 11:37

## 2022-10-23 RX ADMIN — PANTOPRAZOLE SODIUM 40 MILLIGRAM(S): 20 TABLET, DELAYED RELEASE ORAL at 14:25

## 2022-10-23 RX ADMIN — HEPARIN SODIUM 650 UNIT(S)/HR: 5000 INJECTION INTRAVENOUS; SUBCUTANEOUS at 19:19

## 2022-10-23 RX ADMIN — HEPARIN SODIUM 0 UNIT(S)/HR: 5000 INJECTION INTRAVENOUS; SUBCUTANEOUS at 22:59

## 2022-10-23 RX ADMIN — Medication 25 MILLIGRAM(S): at 18:50

## 2022-10-23 RX ADMIN — Medication 2.5 MILLIGRAM(S): at 11:30

## 2022-10-23 NOTE — ED PROVIDER NOTE - NS ED ATTENDING STATEMENT MOD
This was a shared visit with the ALEXEY. I reviewed and verified the documentation and independently performed the documented:

## 2022-10-23 NOTE — H&P ADULT - NSHPPHYSICALEXAM_GEN_ALL_CORE
PHYSICAL EXAM:    GENERAL: NAD, well-groomed, well-developed  HEAD:  Atraumatic, Normocephalic  EYES: EOMI, PERRLA, conjunctiva and sclera clear  ENMT: No tonsillar erythema, exudates, or enlargement; Moist mucous membranes, , No lesions  NECK: Supple, No JVD, Normal thyroid  NERVOUS SYSTEM:  Alert & Oriented X4, ; Motor Strength 5/5 B/L upper and lower extremities; DTRs 2+ intact and symmetric  CHEST/LUNG: Clear  bilaterally; No rales, rhonchi, wheezing, or rubs  HEART: IRRegular rate and rhythm; No murmurs, rubs, or gallops  ABDOMEN: Soft, Nontender, Nondistended; no  masses Bowel sounds present  EXTREMITIES:  + Peripheral Pulses, No clubbing, cyanosis, or edema  LYMPH: No lymphadenopathy noted   RECTAL: deferred     SKIN: No rashes or lesions

## 2022-10-23 NOTE — ED PROVIDER NOTE - ATTENDING APP SHARED VISIT CONTRIBUTION OF CARE
I have personally seen and examined this pt I have fully participated in the care of this pt I have made amendments to the documentation and otherwise hx, exam and plan as documented by ACP

## 2022-10-23 NOTE — PATIENT PROFILE ADULT - FALL HARM RISK - HARM RISK INTERVENTIONS
Assistance with ambulation/Assistance OOB with selected safe patient handling equipment/Communicate Risk of Fall with Harm to all staff/Discuss with provider need for PT consult/Monitor gait and stability/Reinforce activity limits and safety measures with patient and family/Tailored Fall Risk Interventions/Visual Cue: Yellow wristband and red socks/Bed in lowest position, wheels locked, appropriate side rails in place/Call bell, personal items and telephone in reach/Instruct patient to call for assistance before getting out of bed or chair/Non-slip footwear when patient is out of bed/[place] to call system/Physically safe environment - no spills, clutter or unnecessary equipment/Purposeful Proactive Rounding/Room/bathroom lighting operational, light cord in reach

## 2022-10-23 NOTE — ED PROVIDER NOTE - CLINICAL SUMMARY MEDICAL DECISION MAKING FREE TEXT BOX
89-year-old female history of hypertension presents for weakness.  Patient with neighbors who states that patient was at home by himself and called in after he was weak starting this morning, noted to be tachycardic to 150s.  Will treat for sepsis, provide dose of cardizem, reassess.

## 2022-10-23 NOTE — H&P ADULT - NSHPLABSRESULTS_GEN_ALL_CORE
LABS:                        11.6   7.74  )-----------( 136      ( 23 Oct 2022 10:33 )             35.3     10    141  |  109<H>  |  24<H>  ----------------------------<  224<H>  4.2   |  24  |  1.29    Ca    9.3      23 Oct 2022 10:33  Mg     2.0     10    TPro  7.3  /  Alb  3.8  /  TBili  0.7  /  DBili  x   /  AST  53<H>  /  ALT  84<H>  /  AlkPhos  78  10    PT/INR - ( 23 Oct 2022 10:33 )   PT: 14.0 sec;   INR: 1.16 ratio         PTT - ( 23 Oct 2022 10:33 )  PTT:30.0 sec  Urinalysis Basic - ( 23 Oct 2022 14:35 )    Color: Yellow / Appearance: Clear / S.015 / pH: x  Gluc: x / Ketone: Negative  / Bili: Negative / Urobili: Negative mg/dL   Blood: x / Protein: 30 mg/dL / Nitrite: Negative   Leuk Esterase: Small / RBC: Negative /HPF / WBC 3-5   Sq Epi: x / Non Sq Epi: Few / Bacteria: Many

## 2022-10-23 NOTE — ED ADULT NURSE NOTE - OBJECTIVE STATEMENT
AAOx3 pt BIB neighbor states that she lowered herself to the floor this AM while having breakfast, states she went down on her knees secondary to generalized weakness , denies LOC , denies head trauma. Pt denies SOB and chest pain. Pt denies fever, chills, nausea, vomiting and diarrhea Pt is anxious and arms are shaking upon assessment. Pt is tachypneic.

## 2022-10-23 NOTE — ED ADULT NURSE REASSESSMENT NOTE - NS ED NURSE REASSESS COMMENT FT1
Pt AAOx3. Family member at bedside. Pt denies pain and discomfort. Breathing unlabored and spontaneous. Cardiac monitor and pulse ox in place, pt in NAD at this time.
aPTT 153.1 seconds. Stopped heparin drip per ACS heparin nomogram, to restart in 1 hour.
yes

## 2022-10-23 NOTE — H&P ADULT - PROBLEM SELECTOR PLAN 2
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  AND SKIN GRAFT CLOSURE AND APPLICATION OF WOUND VAC STSG 14cm x15cm    Anesthesia: General    Surgeon(s):  Morelia Benitez MD    Staff:  Kori Scrub: Quinton Leventhal  Scrub Person First: Carrie Gannon; Dani Stark RN     Estimated Blood Loss: 949 ml    Complications: None Bleeding and need for STSG were expected    Specimens:   ID Type Source Tests Collected by Time Destination   A : left lower leg Tissue Leg SURGICAL PATHOLOGY Morelia Benitez MD 5/25/2018 0930          Drains:   Negative Pressure Wound Therapy (Active)   Unit Type KCI 5/2/2018 11:15 AM   Dressing Type Black foam 5/2/2018 11:15 AM   Cycle Continuous 5/2/2018 11:15 AM   Target Pressure (mmHg) 125 5/2/2018 11:15 AM   Drainage Amount Small 5/2/2018 11:15 AM   Drainage Description Serosanguinous 5/2/2018 11:15 AM       Findings: Clean tissue at line of resection.     Morelia Benitez MD  Date: 5/25/2018  Time: 11:45 AM
metoprolol

## 2022-10-23 NOTE — H&P ADULT - HISTORY OF PRESENT ILLNESS
: 89-year-old female history of hypertension presents for weakness.  Patient with neighbors who states that patient was at home by herself   and called in after he was weak starting this morning.  Neighbor also states that she is been having intermittent weakness of the last week.  Patient denies any fevers chills or other complaints.  Patient states due to weakness she lowered herself to the floor first on her knees but denies hitting her head and denies any injury that she lowered herself to the floor slowly.  Patient denies any chest pain shortness of breath palpitations  dizziness lightheadedness abdominal pain GI symptoms.

## 2022-10-23 NOTE — H&P ADULT - ASSESSMENT
IMPROVE VTE Individual Risk Assessment    RISK                                                                Points    [  ] Previous VTE                                                  3    [  ] Thrombophilia                                               2    [  ] Lower limb paralysis                                      2        (unable to hold up >15 seconds)      [  ] Current Cancer                                              2         (within 6 months)    [x  ] Immobilization > 24 hrs                                1    [  ] ICU/CCU stay > 24 hours                              1    [ x ] Age > 60                                                      1    IMPROVE VTE Score ____2_____    IMPROVE Score 0-1: Low Risk, No VTE prophylaxis required for most patients, encourage ambulation.   IMPROVE Score 2-3: At risk, pharmacologic VTE prophylaxis is indicated for most patients (in the absence of a contraindication)  IMPROVE Score > or = 4: High Risk, pharmacologic VTE prophylaxis is indicated for most patients (in the absence of a contraindication)

## 2022-10-23 NOTE — ED PROVIDER NOTE - OBJECTIVE STATEMENT
89-year-old female history of hypertension presents for weakness.  Patient with neighbors who states that patient was at home by himself and called in after he was weak starting this morning.  Neighbor also states that she is been having intermittent weakness of the last week.  Patient denies any fevers chills or other complaints.  Patient states due to weakness she lowered herself to the floor first on her knees but denies hitting her head and denies any injury that she lowered herself to the floor slowly.  Patient denies any chest pain shortness of breath dizziness lightheadedness abdominal pain GI symptoms.

## 2022-10-23 NOTE — ED ADULT TRIAGE NOTE - CHIEF COMPLAINT QUOTE
pt BIB neighbor that she lowered herself to the floor this AM while having breakfast, states she went down on her knees secondary to generalized weakness , denies LOC , denies head trauma or CP, + peripheral neuropathy

## 2022-10-23 NOTE — ED PROVIDER NOTE - CARE PLAN
1 Principal Discharge DX:	Atrial fibrillation  Secondary Diagnosis:	NSTEMI (non-ST elevation myocardial infarction)

## 2022-10-24 LAB
ALBUMIN SERPL ELPH-MCNC: 3.1 G/DL — LOW (ref 3.3–5)
ALP SERPL-CCNC: 70 U/L — SIGNIFICANT CHANGE UP (ref 40–120)
ALT FLD-CCNC: 95 U/L — HIGH (ref 12–78)
ANION GAP SERPL CALC-SCNC: 7 MMOL/L — SIGNIFICANT CHANGE UP (ref 5–17)
APTT BLD: 123.5 SEC — CRITICAL HIGH (ref 27.5–35.5)
APTT BLD: 70.6 SEC — HIGH (ref 27.5–35.5)
APTT BLD: 87 SEC — HIGH (ref 27.5–35.5)
AST SERPL-CCNC: 41 U/L — HIGH (ref 15–37)
BILIRUB SERPL-MCNC: 0.8 MG/DL — SIGNIFICANT CHANGE UP (ref 0.2–1.2)
BUN SERPL-MCNC: 12 MG/DL — SIGNIFICANT CHANGE UP (ref 7–23)
CALCIUM SERPL-MCNC: 8.6 MG/DL — SIGNIFICANT CHANGE UP (ref 8.5–10.1)
CHLORIDE SERPL-SCNC: 111 MMOL/L — HIGH (ref 96–108)
CO2 SERPL-SCNC: 24 MMOL/L — SIGNIFICANT CHANGE UP (ref 22–31)
CREAT SERPL-MCNC: 0.77 MG/DL — SIGNIFICANT CHANGE UP (ref 0.5–1.3)
EGFR: 71 ML/MIN/1.73M2 — SIGNIFICANT CHANGE UP
FLUAV AG NPH QL: SIGNIFICANT CHANGE UP
FLUBV AG NPH QL: SIGNIFICANT CHANGE UP
GLUCOSE SERPL-MCNC: 91 MG/DL — SIGNIFICANT CHANGE UP (ref 70–99)
HCT VFR BLD CALC: 33.6 % — LOW (ref 34.5–45)
HCV AB S/CO SERPL IA: 0.11 S/CO — SIGNIFICANT CHANGE UP (ref 0–0.99)
HCV AB SERPL-IMP: SIGNIFICANT CHANGE UP
HGB BLD-MCNC: 10.8 G/DL — LOW (ref 11.5–15.5)
MCHC RBC-ENTMCNC: 32.1 G/DL — SIGNIFICANT CHANGE UP (ref 32–36)
MCHC RBC-ENTMCNC: 32.3 PG — SIGNIFICANT CHANGE UP (ref 27–34)
MCV RBC AUTO: 100.6 FL — HIGH (ref 80–100)
NRBC # BLD: 0 /100 WBCS — SIGNIFICANT CHANGE UP (ref 0–0)
PLATELET # BLD AUTO: 118 K/UL — LOW (ref 150–400)
POTASSIUM SERPL-MCNC: 3.5 MMOL/L — SIGNIFICANT CHANGE UP (ref 3.5–5.3)
POTASSIUM SERPL-SCNC: 3.5 MMOL/L — SIGNIFICANT CHANGE UP (ref 3.5–5.3)
PROT SERPL-MCNC: 6.3 GM/DL — SIGNIFICANT CHANGE UP (ref 6–8.3)
RBC # BLD: 3.34 M/UL — LOW (ref 3.8–5.2)
RBC # FLD: 14.1 % — SIGNIFICANT CHANGE UP (ref 10.3–14.5)
SARS-COV-2 RNA SPEC QL NAA+PROBE: SIGNIFICANT CHANGE UP
SODIUM SERPL-SCNC: 142 MMOL/L — SIGNIFICANT CHANGE UP (ref 135–145)
T3 SERPL-MCNC: 70 NG/DL — LOW (ref 80–200)
T4 AB SER-ACNC: 6.5 UG/DL — SIGNIFICANT CHANGE UP (ref 4.6–12)
TROPONIN I SERPL-MCNC: 113.2 NG/ML — HIGH (ref 0.01–0.04)
TROPONIN I, HIGH SENSITIVITY RESULT: 114.3 NG/L — HIGH
TROPONIN I, HIGH SENSITIVITY RESULT: 131.5 NG/L — HIGH
TROPONIN I, HIGH SENSITIVITY RESULT: 134.5 NG/L — HIGH
WBC # BLD: 7.9 K/UL — SIGNIFICANT CHANGE UP (ref 3.8–10.5)
WBC # FLD AUTO: 7.9 K/UL — SIGNIFICANT CHANGE UP (ref 3.8–10.5)

## 2022-10-24 PROCEDURE — 99223 1ST HOSP IP/OBS HIGH 75: CPT

## 2022-10-24 PROCEDURE — 71045 X-RAY EXAM CHEST 1 VIEW: CPT | Mod: 26

## 2022-10-24 RX ORDER — ACETAMINOPHEN 500 MG
1000 TABLET ORAL ONCE
Refills: 0 | Status: COMPLETED | OUTPATIENT
Start: 2022-10-24 | End: 2022-10-24

## 2022-10-24 RX ORDER — HEPARIN SODIUM 5000 [USP'U]/ML
400 INJECTION INTRAVENOUS; SUBCUTANEOUS
Qty: 25000 | Refills: 0 | Status: DISCONTINUED | OUTPATIENT
Start: 2022-10-24 | End: 2022-10-26

## 2022-10-24 RX ORDER — METOPROLOL TARTRATE 50 MG
5 TABLET ORAL ONCE
Refills: 0 | Status: COMPLETED | OUTPATIENT
Start: 2022-10-24 | End: 2022-10-24

## 2022-10-24 RX ORDER — METOPROLOL TARTRATE 50 MG
50 TABLET ORAL
Refills: 0 | Status: DISCONTINUED | OUTPATIENT
Start: 2022-10-25 | End: 2022-10-25

## 2022-10-24 RX ORDER — DILTIAZEM HCL 120 MG
5 CAPSULE, EXT RELEASE 24 HR ORAL ONCE
Refills: 0 | Status: DISCONTINUED | OUTPATIENT
Start: 2022-10-24 | End: 2022-10-24

## 2022-10-24 RX ORDER — HEPARIN SODIUM 5000 [USP'U]/ML
3500 INJECTION INTRAVENOUS; SUBCUTANEOUS EVERY 6 HOURS
Refills: 0 | Status: DISCONTINUED | OUTPATIENT
Start: 2022-10-24 | End: 2022-10-26

## 2022-10-24 RX ADMIN — HEPARIN SODIUM 0 UNIT(S)/HR: 5000 INJECTION INTRAVENOUS; SUBCUTANEOUS at 09:46

## 2022-10-24 RX ADMIN — Medication 5 MILLIGRAM(S): at 16:31

## 2022-10-24 RX ADMIN — HEPARIN SODIUM 0 UNIT(S)/HR: 5000 INJECTION INTRAVENOUS; SUBCUTANEOUS at 07:35

## 2022-10-24 RX ADMIN — HEPARIN SODIUM 400 UNIT(S)/HR: 5000 INJECTION INTRAVENOUS; SUBCUTANEOUS at 20:17

## 2022-10-24 RX ADMIN — Medication 25 MILLIGRAM(S): at 18:19

## 2022-10-24 RX ADMIN — Medication 400 MILLIGRAM(S): at 16:40

## 2022-10-24 RX ADMIN — Medication 25 MILLIGRAM(S): at 06:09

## 2022-10-24 RX ADMIN — Medication 3 MILLIGRAM(S): at 22:03

## 2022-10-24 RX ADMIN — Medication 1000 MILLIGRAM(S): at 17:43

## 2022-10-24 NOTE — CONSULT NOTE ADULT - SUBJECTIVE AND OBJECTIVE BOX
CARDIOLOGY CONSULTATION NOTE                                                                             ROSS BARRERA is a 89y Female with history of hypertension presents for weakness.  Patient with neighbors who states that patient was at home by herself   and called in after he was weak starting this morning.  Neighbor also states that she is been having intermittent weakness of the last week.  Patient denies any fevers chills or other complaints.  Patient states due to weakness she lowered herself to the floor first on her knees but denies hitting her head and denies any injury that she lowered herself to the floor slowly.  Patient denies any chest pain shortness of breath palpitations  dizziness lightheadedness abdominal pain GI symptoms.   REVIEW OF SYSTEMS: -----------------------------  CONSTITUTIONAL: No fever, weight loss, or fatigue EYES: No eye pain, visual disturbances, or discharge ENMT:  No difficulty hearing, tinnitus, vertigo; No sinus or throat pain NECK: No pain or stiffness BREASTS: No pain, masses, or nipple discharge RESPIRATORY: No cough, wheezing, chills or hemoptysis; No shortness of breath CARDIOVASCULAR: See HPI GASTROINTESTINAL: No abdominal or epigastric pain. No nausea, vomiting, or hematemesis; No diarrhea or constipation. No melena or hematochezia. GENITOURINARY: No dysuria, frequency, hematuria, or incontinence NEUROLOGICAL: No headaches, memory loss, loss of strength, numbness, or tremors SKIN: No itching, burning, rashes, or lesions  LYMPH NODES: No enlarged glands ENDOCRINE: No heat or cold intolerance; No hair loss MUSCULOSKELETAL: No joint pain or swelling; No muscle, back, or extremity pain PSYCHIATRIC: No depression, anxiety, mood swings, or difficulty sleeping HEME/LYMPH: No easy bruising, or bleeding gums ALLERGY AND IMMUNOLOGIC: No hives or eczema  Home Medications: atenolol 50 mg oral tablet: 1 tab(s) orally once a day (15 Dec 2017 14:40) melatonin 3 mg oral tablet: 1 tab(s) orally  (15 Dec 2017 14:40)   MEDICATIONS  (STANDING): heparin  Infusion.  Unit(s)/Hr (6.5 mL/Hr) IV Continuous <Continuous> melatonin 3 milliGRAM(s) Oral at bedtime metoprolol tartrate 25 milliGRAM(s) Oral every 12 hours   ALLERGIES: No Known Allergies   FAMILY HISTORY:   PHYSICAL EXAMINATION: ----------------------------- T(C): 37.4 (10-24-22 @ 05:31), Max: 37.4 (10-24-22 @ 05:31) HR: 119 (10-24-22 @ 05:31) (116 - 150) BP: 128/82 (10-24-22 @ 05:31) (117/76 - 148/79) RR: 18 (10-24-22 @ 05:31) (16 - 22) SpO2: 96% (10-24-22 @ 05:31) (93% - 98%) Wt(kg): --  Height (cm): 157.5 (10-23 @ 23:12) Weight (kg): 56.4 (10-23 @ 23:12) BMI (kg/m2): 22.7 (10-23 @ 23:12) BSA (m2): 1.56 (10-23 @ 23:12)  Constitutional: well developed, normal appearance, well groomed, well nourished, no deformities and no acute distress.  Eyes: the conjunctiva exhibited no abnormalities and the eyelids demonstrated no xanthelasmas.  HEENT: normal oral mucosa, no oral pallor and no oral cyanosis.  Neck: normal jugular venous A waves present, normal jugular venous V waves present and no jugular venous hammonds A waves.  Pulmonary: no respiratory distress, normal respiratory rhythm and effort, no accessory muscle use and lungs were clear to auscultation bilaterally.  Cardiovascular: heart rate and rhythm were normal, normal S1 and S2 and no murmur, gallop, rub, heave or thrill are present.  Abdomen: soft, non-tender, no hepato-splenomegaly and no abdominal mass palpated.  Musculoskeletal: the gait could not be assessed..  Extremities: no clubbing of the fingernails, no localized cyanosis, no petechial hemorrhages and no ischemic changes.  Skin: normal skin color and pigmentation, no rash, no venous stasis, no skin lesions, no skin ulcer and no xanthoma was observed.  Psychiatric: oriented to person, place, and time, the affect was normal, the mood was normal and not feeling anxious.   ECG: -------    LABS:  -------- 10-23  141  |  109<H>  |  24<H> ----------------------------<  224<H> 4.2   |  24  |  1.29  Ca    9.3      23 Oct 2022 10:33 Mg     2.0     10-23  TPro  7.3  /  Alb  3.8  /  TBili  0.7  /  DBili  x   /  AST  53<H>  /  ALT  84<H>  /  AlkPhos  78  10-23                       10.6  6.58  )-----------( 112      ( 23 Oct 2022 21:24 )            32.9    PT/INR - ( 23 Oct 2022 10:33 )   PT: 14.0 sec;   INR: 1.16 ratio      PTT - ( 23 Oct 2022 20:33 )  PTT:153.1 sec      RADIOLOGY REPORTS: -----------------------------    ECHOCARDIOGRAM: ---------------------------      CARDIOLOGY CONSULTATION NOTE                                                                             ROSS BARRERA is a 89y Female with history of hypertension presents for weakness.  Patient with neighbors who states that patient was at home by herself   and called in after he was weak starting this morning.  Neighbor also states that she is been having intermittent weakness of the last week.  Patient denies any fevers chills or other complaints.  Patient states due to weakness she lowered herself to the floor first on her knees but denies hitting her head and denies any injury that she lowered herself to the floor slowly.  Patient denies any chest pain shortness of breath palpitations  dizziness lightheadedness abdominal pain GI symptoms.   REVIEW OF SYSTEMS: -----------------------------  CONSTITUTIONAL: No fever, weight loss, or fatigue EYES: No eye pain, visual disturbances, or discharge ENMT:  No difficulty hearing, tinnitus, vertigo; No sinus or throat pain NECK: No pain or stiffness BREASTS: No pain, masses, or nipple discharge RESPIRATORY: No cough, wheezing, chills or hemoptysis; No shortness of breath CARDIOVASCULAR: See HPI GASTROINTESTINAL: No abdominal or epigastric pain. No nausea, vomiting, or hematemesis; No diarrhea or constipation. No melena or hematochezia. GENITOURINARY: No dysuria, frequency, hematuria, or incontinence NEUROLOGICAL: No headaches, memory loss, loss of strength, numbness, or tremors SKIN: No itching, burning, rashes, or lesions  LYMPH NODES: No enlarged glands ENDOCRINE: No heat or cold intolerance; No hair loss MUSCULOSKELETAL: No joint pain or swelling; No muscle, back, or extremity pain PSYCHIATRIC: No depression, anxiety, mood swings, or difficulty sleeping HEME/LYMPH: No easy bruising, or bleeding gums ALLERGY AND IMMUNOLOGIC: No hives or eczema  Home Medications: atenolol 50 mg oral tablet: 1 tab(s) orally once a day (15 Dec 2017 14:40) melatonin 3 mg oral tablet: 1 tab(s) orally  (15 Dec 2017 14:40)   MEDICATIONS  (STANDING): heparin  Infusion.  Unit(s)/Hr (6.5 mL/Hr) IV Continuous <Continuous> melatonin 3 milliGRAM(s) Oral at bedtime metoprolol tartrate 25 milliGRAM(s) Oral every 12 hours   ALLERGIES: No Known Allergies   FAMILY HISTORY:   PHYSICAL EXAMINATION: ----------------------------- T(C): 37.4 (10-24-22 @ 05:31), Max: 37.4 (10-24-22 @ 05:31) HR: 119 (10-24-22 @ 05:31) (116 - 150) BP: 128/82 (10-24-22 @ 05:31) (117/76 - 148/79) RR: 18 (10-24-22 @ 05:31) (16 - 22) SpO2: 96% (10-24-22 @ 05:31) (93% - 98%) Wt(kg): --  Height (cm): 157.5 (10-23 @ 23:12) Weight (kg): 56.4 (10-23 @ 23:12) BMI (kg/m2): 22.7 (10-23 @ 23:12) BSA (m2): 1.56 (10-23 @ 23:12)  Constitutional: well developed, normal appearance, well groomed, well nourished, no deformities and no acute distress.  Eyes: the conjunctiva exhibited no abnormalities and the eyelids demonstrated no xanthelasmas.  HEENT: normal oral mucosa, no oral pallor and no oral cyanosis.  Neck: normal jugular venous A waves present, normal jugular venous V waves present and no jugular venous hammonds A waves.  Pulmonary: no respiratory distress, normal respiratory rhythm and effort, no accessory muscle use and lungs were clear to auscultation bilaterally.  Cardiovascular: heart rate and rhythm were normal, normal S1 and S2 and no murmur, gallop, rub, heave or thrill are present.  Abdomen: soft, non-tender, no hepato-splenomegaly and no abdominal mass palpated.  Musculoskeletal: the gait could not be assessed..  Extremities: no clubbing of the fingernails, no localized cyanosis, no petechial hemorrhages and no ischemic changes.  Skin: normal skin color and pigmentation, no rash, no venous stasis, no skin lesions, no skin ulcer and no xanthoma was observed.  Psychiatric: oriented to person, place, and time, the affect was normal, the mood was normal and not feeling anxious.   ECG: ------- `< from: 12 Lead ECG (10.23.22 @ 10:09) >  Ventricular Rate 152 BPM  Atrial Rate 286 BPM  QRS Duration 78 ms  Q-T Interval 334 ms  QTC Calculation(Bazett) 531 ms  R Axis 2 degrees  T Axis 218 degrees  Diagnosis Line Atrial flutter with variable AV block Marked ST abnormality,possible inferior subendocardial injury Abnormal ECG No previous ECGs available Confirmed by Celio Juárez MD ([id number]) on 10/24/2022 10:46:50 PM  < end of copied text >    LABS:  -------- 10-23  141  |  109<H>  |  24<H> ----------------------------<  224<H> 4.2   |  24  |  1.29  Ca    9.3      23 Oct 2022 10:33 Mg     2.0     10-23  TPro  7.3  /  Alb  3.8  /  TBili  0.7  /  DBili  x   /  AST  53<H>  /  ALT  84<H>  /  AlkPhos  78  10-23                       10.6  6.58  )-----------( 112      ( 23 Oct 2022 21:24 )            32.9    PT/INR - ( 23 Oct 2022 10:33 )   PT: 14.0 sec;   INR: 1.16 ratio      PTT - ( 23 Oct 2022 20:33 )  PTT:153.1 sec      RADIOLOGY REPORTS: -----------------------------  < from: Xray Chest 1 View- PORTABLE-Urgent (10.23.22 @ 10:44) >  ACC: [id number] EXAM:  XR CHEST PORTABLE URGENT 1V                        PROCEDURE DATE:  10/23/2022      INTERPRETATION:  INDICATION: Sepsis  Chest one view  COMPARISON: 12/11/2017  FINDINGS: Heart/Vascular: Mild cardiomegaly. Atherosclerotic changes. Mediastinum  within normal limits. Pulmonary: Midline trachea. There is no focal infiltrate, congestion or  effusion.  Bones: There is no fracture. Lines and catheter: Overlying EKG leads and wires.  Impression:  No acute pulmonary disease.  --- End of Report ---      OMAR TREVIÑO DO; Attending Radiologist This document has been electronically signed. Oct 24 2022  7:34AM  < end of copied text >   ECHOCARDIOGRAM: ---------------------------

## 2022-10-24 NOTE — PROGRESS NOTE ADULT - SUBJECTIVE AND OBJECTIVE BOX
INTERVAL HPI/OVERNIGHT EVENTS:        REVIEW OF SYSTEMS:  CONSTITUTIONAL:  feels well no  complaints    NECK: No pain or stiffnes  RESPIRATORY: No SOB   CARDIOVASCULAR: No chest pain, palpitations, dizziness,   GASTROINTESTINAL: No abdominal pain. No nausea, vomiting,   NEUROLOGICAL: No headaches, no  blurry  vision no  dizziness  SKIN: No itching,   MUSCULOSKELETAL: No pain    MEDICATION:  heparin   Injectable 3200 Unit(s) IV Push every 6 hours PRN  heparin  Infusion.  Unit(s)/Hr IV Continuous <Continuous>  melatonin 3 milliGRAM(s) Oral at bedtime  metoprolol tartrate 25 milliGRAM(s) Oral every 12 hours    Vital Signs Last 24 Hrs  T(C): 37.4 (24 Oct 2022 05:31), Max: 37.4 (24 Oct 2022 05:31)  T(F): 99.4 (24 Oct 2022 05:31), Max: 99.4 (24 Oct 2022 05:31)  HR: 119 (24 Oct 2022 05:31) (116 - 150)  BP: 128/82 (24 Oct 2022 05:31) (117/76 - 148/79)  BP(mean): --  RR: 18 (24 Oct 2022 05:31) (16 - 22)  SpO2: 96% (24 Oct 2022 05:31) (93% - 98%)    Parameters below as of 24 Oct 2022 05:31  Patient On (Oxygen Delivery Method): nasal cannula        PHYSICAL EXAM:  GENERAL: NAD, well-groomed, well-developed  HEENT : Conjuntivae  clear sclerae anicteric  NECK: Supple, No JVD, Normal thyroid  NERVOUS SYSTEM:  Alert oriented   no  focal  deficits;   CHEST/LUNG: Clear    HEART: IRRegular rate and rhythm; No murmurs, rubs, or gallops  ABDOMEN: Soft, Nontender, Nondistended; Bowel sounds present  EXTREMITIES:  no  edema no  tenderness  SKIN: No rashes   LABS:                        10.8   7.90  )-----------( 118      ( 24 Oct 2022 06:55 )             33.6     10    141  |  109<H>  |  24<H>  ----------------------------<  224<H>  4.2   |  24  |  1.29    Ca    9.3      23 Oct 2022 10:33  Mg     2.0     10-23    TPro  7.3  /  Alb  3.8  /  TBili  0.7  /  DBili  x   /  AST  53<H>  /  ALT  84<H>  /  AlkPhos  78  10-23    PT/INR - ( 23 Oct 2022 10:33 )   PT: 14.0 sec;   INR: 1.16 ratio         PTT - ( 23 Oct 2022 20:33 )  PTT:153.1 sec  Urinalysis Basic - ( 23 Oct 2022 14:35 )    Color: Yellow / Appearance: Clear / S.015 / pH: x  Gluc: x / Ketone: Negative  / Bili: Negative / Urobili: Negative mg/dL   Blood: x / Protein: 30 mg/dL / Nitrite: Negative   Leuk Esterase: Small / RBC: Negative /HPF / WBC 3-5   Sq Epi: x / Non Sq Epi: Few / Bacteria: Many      CAPILLARY BLOOD GLUCOSE          RADIOLOGY & ADDITIONAL TESTS:    Imaging reports  Personally Reviewed:  [ ] YES  [ ] NO    Consultant(s) Notes Reviewed:  [ x] YES  [ ] NO    Care Discussed with Consultants/Other Providers [ x] YES  [ ] NO    Problem/Plan - 1:  ·  Problem: Atrial fibrillation, new onset.   ·  Plan: monitor  on  telemetry  full AC  rate  control  TTE  trend  troponin  cardiology  eval.    Problem/Plan - 2:  ·  Problem: HTN (hypertension).   ·  Plan: metoprolol.    Problem/Plan - 3:  ·  Problem: Insomnia.   ·  Plan: melatonin.     INTERVAL HPI/OVERNIGHT EVENTS:        REVIEW OF SYSTEMS:  CONSTITUTIONAL:  feels well no  complaints    NECK: No pain or stiffnes  RESPIRATORY: No SOB   CARDIOVASCULAR: No chest pain, palpitations, dizziness,   GASTROINTESTINAL: No abdominal pain. No nausea, vomiting,   NEUROLOGICAL: No headaches, no  blurry  vision no  dizziness  SKIN: No itching,   MUSCULOSKELETAL: No pain    MEDICATION:  heparin   Injectable 3200 Unit(s) IV Push every 6 hours PRN  heparin  Infusion.  Unit(s)/Hr IV Continuous <Continuous>  melatonin 3 milliGRAM(s) Oral at bedtime  metoprolol tartrate 25 milliGRAM(s) Oral every 12 hours    Vital Signs Last 24 Hrs  T(C): 37.4 (24 Oct 2022 05:31), Max: 37.4 (24 Oct 2022 05:31)  T(F): 99.4 (24 Oct 2022 05:31), Max: 99.4 (24 Oct 2022 05:31)  HR: 119 (24 Oct 2022 05:31) (116 - 150)  BP: 128/82 (24 Oct 2022 05:31) (117/76 - 148/79)  BP(mean): --  RR: 18 (24 Oct 2022 05:31) (16 - 22)  SpO2: 96% (24 Oct 2022 05:31) (93% - 98%)    Parameters below as of 24 Oct 2022 05:31  Patient On (Oxygen Delivery Method): nasal cannula        PHYSICAL EXAM:  GENERAL: NAD, well-groomed, well-developed  HEENT : Conjuntivae  clear sclerae anicteric  NECK: Supple, No JVD, Normal thyroid  NERVOUS SYSTEM:  Alert oriented   no  focal  deficits;   CHEST/LUNG: Clear    HEART: IRRegular rate and rhythm; No murmurs, rubs, or gallops  ABDOMEN: Soft, Nontender, Nondistended; Bowel sounds present  EXTREMITIES:  no  edema no  tenderness  SKIN: No rashes   LABS:                        10.8   7.90  )-----------( 118      ( 24 Oct 2022 06:55 )             33.6     10    141  |  109<H>  |  24<H>  ----------------------------<  224<H>  4.2   |  24  |  1.29    Ca    9.3      23 Oct 2022 10:33  Mg     2.0     10-23    TPro  7.3  /  Alb  3.8  /  TBili  0.7  /  DBili  x   /  AST  53<H>  /  ALT  84<H>  /  AlkPhos  78  10-23    PT/INR - ( 23 Oct 2022 10:33 )   PT: 14.0 sec;   INR: 1.16 ratio         PTT - ( 23 Oct 2022 20:33 )  PTT:153.1 sec  Urinalysis Basic - ( 23 Oct 2022 14:35 )    Color: Yellow / Appearance: Clear / S.015 / pH: x  Gluc: x / Ketone: Negative  / Bili: Negative / Urobili: Negative mg/dL   Blood: x / Protein: 30 mg/dL / Nitrite: Negative   Leuk Esterase: Small / RBC: Negative /HPF / WBC 3-5   Sq Epi: x / Non Sq Epi: Few / Bacteria: Many      CAPILLARY BLOOD GLUCOSE          RADIOLOGY & ADDITIONAL TESTS:    Imaging reports  Personally Reviewed:  [ ] YES  [ ] NO    Consultant(s) Notes Reviewed:  [ x] YES  [ ] NO    Care Discussed with Consultants/Other Providers [ x] YES  [ ] NO    Problem/Plan - 1:  ·  Problem: Atrial fibrillation, new onset.   ·  Plan: monitor  on  telemetry  full AC  rate  control  TTE  trend  troponin  cardiology  eval.    Problem/Plan - 2:  ·  Problem: HTN (hypertension).   ·  Plan: metoprolol.    Problem/Plan - 3:  ·  Problem: Insomnia.   ·  Plan: melatonin.  Anemia  monitor  H/h  continue  AC  further  w/u  and  Rx  as per  clincal course

## 2022-10-24 NOTE — CONSULT NOTE ADULT - ASSESSMENT
The chart has been reviewed but the patient has not yet been examined.  Full note to follow. 89 yo female with h/o HTN, brought in for intermittent weakness. Near syncope x 1, no LOC.  Found to be in AF, rates rapid at times.  On IV heparin, given bbl. would titrate bbl to achieve VR's<100.  Doubt candidate for long term AC, weakness/ prone to fall and evidence of anemia. Given advanced age, little to gain from long term AC, but will withhold decision pending echo results.  AMbulate and assess for orthostasis.  Etiology of anemia? 89 yo female with h/o HTN, brought in for intermittent weakness. Near syncope x 1, no LOC.  Found to be in AF, rates rapid at times.  On IV heparin, given bbl. would titrate bbl to achieve VR's<100.  Doubt candidate for long term AC, weakness/ prone to fall and evidence of anemia. Given advanced age, little to gain from long term AC, but will withhold decision pending echo results.  Elevated cardiac enzymes likely indicative of demand ischemia rather than ACS, would not pursue ischemia evaluation as long as she is asymptomatic.  AMbulate and assess for orthostasis.  Etiology of anemia? Check iron indices. B12 levels. Occult blood?

## 2022-10-25 LAB
ALBUMIN SERPL ELPH-MCNC: 2.8 G/DL — LOW (ref 3.3–5)
ALP SERPL-CCNC: 73 U/L — SIGNIFICANT CHANGE UP (ref 40–120)
ALT FLD-CCNC: 75 U/L — SIGNIFICANT CHANGE UP (ref 12–78)
ANION GAP SERPL CALC-SCNC: 7 MMOL/L — SIGNIFICANT CHANGE UP (ref 5–17)
APTT BLD: 40.5 SEC — HIGH (ref 27.5–35.5)
APTT BLD: 41.3 SEC — HIGH (ref 27.5–35.5)
APTT BLD: 64.6 SEC — HIGH (ref 27.5–35.5)
APTT BLD: 90.7 SEC — HIGH (ref 27.5–35.5)
AST SERPL-CCNC: 28 U/L — SIGNIFICANT CHANGE UP (ref 15–37)
BILIRUB SERPL-MCNC: 0.8 MG/DL — SIGNIFICANT CHANGE UP (ref 0.2–1.2)
BUN SERPL-MCNC: 17 MG/DL — SIGNIFICANT CHANGE UP (ref 7–23)
CALCIUM SERPL-MCNC: 8.8 MG/DL — SIGNIFICANT CHANGE UP (ref 8.5–10.1)
CHLORIDE SERPL-SCNC: 108 MMOL/L — SIGNIFICANT CHANGE UP (ref 96–108)
CO2 SERPL-SCNC: 26 MMOL/L — SIGNIFICANT CHANGE UP (ref 22–31)
CREAT SERPL-MCNC: 0.77 MG/DL — SIGNIFICANT CHANGE UP (ref 0.5–1.3)
CULTURE RESULTS: SIGNIFICANT CHANGE UP
EGFR: 71 ML/MIN/1.73M2 — SIGNIFICANT CHANGE UP
GLUCOSE SERPL-MCNC: 94 MG/DL — SIGNIFICANT CHANGE UP (ref 70–99)
HCT VFR BLD CALC: 35.8 % — SIGNIFICANT CHANGE UP (ref 34.5–45)
HGB BLD-MCNC: 11.5 G/DL — SIGNIFICANT CHANGE UP (ref 11.5–15.5)
IRON SATN MFR SERPL: 19 % — SIGNIFICANT CHANGE UP (ref 14–50)
IRON SATN MFR SERPL: 48 UG/DL — SIGNIFICANT CHANGE UP (ref 30–160)
MCHC RBC-ENTMCNC: 32.1 G/DL — SIGNIFICANT CHANGE UP (ref 32–36)
MCHC RBC-ENTMCNC: 32.4 PG — SIGNIFICANT CHANGE UP (ref 27–34)
MCV RBC AUTO: 100.8 FL — HIGH (ref 80–100)
NRBC # BLD: 0 /100 WBCS — SIGNIFICANT CHANGE UP (ref 0–0)
PLATELET # BLD AUTO: 106 K/UL — LOW (ref 150–400)
POTASSIUM SERPL-MCNC: 3.7 MMOL/L — SIGNIFICANT CHANGE UP (ref 3.5–5.3)
POTASSIUM SERPL-SCNC: 3.7 MMOL/L — SIGNIFICANT CHANGE UP (ref 3.5–5.3)
PROT SERPL-MCNC: 6.6 GM/DL — SIGNIFICANT CHANGE UP (ref 6–8.3)
RBC # BLD: 3.55 M/UL — LOW (ref 3.8–5.2)
RBC # FLD: 13.8 % — SIGNIFICANT CHANGE UP (ref 10.3–14.5)
SODIUM SERPL-SCNC: 141 MMOL/L — SIGNIFICANT CHANGE UP (ref 135–145)
SPECIMEN SOURCE: SIGNIFICANT CHANGE UP
TIBC SERPL-MCNC: 254 UG/DL — SIGNIFICANT CHANGE UP (ref 220–430)
UIBC SERPL-MCNC: 206 UG/DL — SIGNIFICANT CHANGE UP (ref 110–370)
VIT B12 SERPL-MCNC: 893 PG/ML — SIGNIFICANT CHANGE UP (ref 232–1245)
WBC # BLD: 8.03 K/UL — SIGNIFICANT CHANGE UP (ref 3.8–10.5)
WBC # FLD AUTO: 8.03 K/UL — SIGNIFICANT CHANGE UP (ref 3.8–10.5)

## 2022-10-25 PROCEDURE — 99233 SBSQ HOSP IP/OBS HIGH 50: CPT

## 2022-10-25 PROCEDURE — 71275 CT ANGIOGRAPHY CHEST: CPT | Mod: 26

## 2022-10-25 RX ORDER — METOPROLOL TARTRATE 50 MG
50 TABLET ORAL EVERY 8 HOURS
Refills: 0 | Status: DISCONTINUED | OUTPATIENT
Start: 2022-10-25 | End: 2022-10-26

## 2022-10-25 RX ORDER — METOPROLOL TARTRATE 50 MG
2.5 TABLET ORAL ONCE
Refills: 0 | Status: COMPLETED | OUTPATIENT
Start: 2022-10-25 | End: 2022-10-25

## 2022-10-25 RX ADMIN — HEPARIN SODIUM 500 UNIT(S)/HR: 5000 INJECTION INTRAVENOUS; SUBCUTANEOUS at 07:18

## 2022-10-25 RX ADMIN — Medication 50 MILLIGRAM(S): at 21:11

## 2022-10-25 RX ADMIN — Medication 3 MILLIGRAM(S): at 21:11

## 2022-10-25 RX ADMIN — HEPARIN SODIUM 0 UNIT(S)/HR: 5000 INJECTION INTRAVENOUS; SUBCUTANEOUS at 08:06

## 2022-10-25 RX ADMIN — HEPARIN SODIUM 500 UNIT(S)/HR: 5000 INJECTION INTRAVENOUS; SUBCUTANEOUS at 21:08

## 2022-10-25 RX ADMIN — HEPARIN SODIUM 500 UNIT(S)/HR: 5000 INJECTION INTRAVENOUS; SUBCUTANEOUS at 19:42

## 2022-10-25 RX ADMIN — Medication 50 MILLIGRAM(S): at 06:17

## 2022-10-25 RX ADMIN — HEPARIN SODIUM 500 UNIT(S)/HR: 5000 INJECTION INTRAVENOUS; SUBCUTANEOUS at 01:23

## 2022-10-25 RX ADMIN — Medication 50 MILLIGRAM(S): at 17:25

## 2022-10-25 RX ADMIN — Medication 5 MILLIGRAM(S): at 00:39

## 2022-10-25 RX ADMIN — HEPARIN SODIUM 400 UNIT(S)/HR: 5000 INJECTION INTRAVENOUS; SUBCUTANEOUS at 08:51

## 2022-10-25 RX ADMIN — HEPARIN SODIUM 500 UNIT(S)/HR: 5000 INJECTION INTRAVENOUS; SUBCUTANEOUS at 17:16

## 2022-10-25 RX ADMIN — Medication 2.5 MILLIGRAM(S): at 13:05

## 2022-10-25 NOTE — PROGRESS NOTE ADULT - SUBJECTIVE AND OBJECTIVE BOX
Patient is a 89y old  Female who presents with a chief complaint of dizzines new  onst  afib  with  rvr (25 Oct 2022 11:28)      PAST MEDICAL & SURGICAL HISTORY:  Anxiety      HTN (hypertension)      No significant past surgical history    INTERVAL HISTORY: Patient in bed, comfortable, no c/o offered.  	  MEDICATIONS:  MEDICATIONS  (STANDING):  heparin  Infusion. 400 Unit(s)/Hr (4 mL/Hr) IV Continuous <Continuous>  melatonin 3 milliGRAM(s) Oral at bedtime  metoprolol tartrate 50 milliGRAM(s) Oral every 8 hours  metoprolol tartrate Injectable 2.5 milliGRAM(s) IV Push once    MEDICATIONS  (PRN):  heparin   Injectable 3500 Unit(s) IV Push every 6 hours PRN For aPTT less than 40      Vitals:  T(F): 98.6 (10-25-22 @ 10:27), Max: 100.3 (10-24-22 @ 16:06)  HR: 122 (10-25-22 @ 10:27) (119 - 160)  BP: 120/79 (10-25-22 @ 10:27) (120/79 - 139/88)  RR: 18 (10-25-22 @ 10:27) (17 - 18)  SpO2: 98% (10-25-22 @ 10:27) (96% - 98%)  Wt(kg): --    10-24 @ 07:01  -  10-25 @ 07:00  --------------------------------------------------------  IN:    Oral Fluid: 660 mL  Total IN: 660 mL    OUT:    Voided (mL): 1000 mL  Total OUT: 1000 mL    Total NET: -340 mL    Weight (kg): 56.4 (10-23 @ 23:12)  BMI (kg/m2): 22.7 (10-23 @ 23:12)      PHYSICAL EXAM:  Neuro: Awake, responsive  CV: S1 S2 irregular   Lungs: CTABL  GI: Soft, BS +, ND, NT  Extremities: No edema    TELEMETRY: Afib HR 120s  	     RADIOLOGY: < from: CT Angio Chest PE Protocol w/ IV Cont (10.25.22 @ 10:01) >  FINDINGS:    LUNGS AND AIRWAYS: Patent central airways.  Bilateral dependent and   basilar atelectasis. Mild ground glass opacities, possibly edema.  PLEURA: Small bilateral pleural effusions.  MEDIASTINUM AND ARCADIO: No lymphadenopathy.  VESSELS: Atherosclerotic calcifications of thoracic aorta and coronary   arteries. Multiple images are degraded by respiratory motion. Excluding   regions with motion artifact, there are no pulmonary arterial filling   defects identified.  HEART: Mild cardiomegaly. No pericardial effusion.  CHEST WALL AND LOWER NECK: Subcutaneous soft tissue edema.  VISUALIZED UPPER ABDOMEN: Reflux of intravenous contrast into the   inferior vena cava and hepatic veins. Mild adrenal thickening.  BONES: Degenerative changes.    IMPRESSION:  Negative for pulmonary embolism.  Small bilateral pleural effusions.      --- End of Report ---        < end of copied text >      DIAGNOSTIC TESTING:    [x ] Echocardiogram: PENDING       LABS:	 	    CARDIAC MARKERS:  Troponin I, Serum: 113.200 ng/mL (10-24 @ 06:55)    25 Oct 2022 07:02    141    |  108    |  17     ----------------------------<  94     3.7     |  26     |  0.77   24 Oct 2022 06:55    142    |  111    |  12     ----------------------------<  91     3.5     |  24     |  0.77   23 Oct 2022 10:33    141    |  109    |  24     ----------------------------<  224    4.2     |  24     |  1.29     Ca    8.8        25 Oct 2022 07:02  Mg     2.0       23 Oct 2022 17:46    TPro  6.6    /  Alb  2.8    /  TBili  0.8    /  DBili  x      /  AST  28     /  ALT  75     /  AlkPhos  73     25 Oct 2022 07:02                          11.5   8.03  )-----------( 106      ( 25 Oct 2022 07:02 )             35.8 ,                       10.8   7.90  )-----------( 118      ( 24 Oct 2022 06:55 )             33.6 ,                       10.6   6.58  )-----------( 112      ( 23 Oct 2022 21:24 )             32.9 ,                       11.6   7.74  )-----------( 136      ( 23 Oct 2022 10:33 )             35.3   proBNP:   Lipid Profile:   HgA1c:   TSH: Thyroid Stimulating Hormone, Serum: 0.865 uIU/mL (10-23 @ 17:46)      PT/PTT- ( 25 Oct 2022 07:02 )   PT: x    ;  PTT: 90.7 sec       PT/PTT- ( 25 Oct 2022 00:15 )   PT: x    ;  PTT: 41.3 sec       PT/PTT- ( 24 Oct 2022 18:15 )   PT: x    ;  PTT: 70.6 sec         INR: 1.16 ratio (10-23 @ 10:33)

## 2022-10-25 NOTE — PROGRESS NOTE ADULT - SUBJECTIVE AND OBJECTIVE BOX
INTERVAL HPI/OVERNIGHT EVENTS:    had  T 100.3  yesterday    REVIEW OF SYSTEMS:  CONSTITUTIONAL:  no  complaints    NECK: No pain or stiffnes  RESPIRATORY: No SOB   CARDIOVASCULAR: No chest pain, palpitations, dizziness,   GASTROINTESTINAL: No abdominal pain. No nausea, vomiting,   NEUROLOGICAL: No headaches, no  blurry  vision no  dizziness  SKIN: No itching,   MUSCULOSKELETAL: No pain    MEDICATION:  heparin   Injectable 3500 Unit(s) IV Push every 6 hours PRN  heparin  Infusion. 400 Unit(s)/Hr IV Continuous <Continuous>  melatonin 3 milliGRAM(s) Oral at bedtime  metoprolol tartrate 50 milliGRAM(s) Oral two times a day    Vital Signs Last 24 Hrs  T(C): 37 (25 Oct 2022 10:27), Max: 37.9 (24 Oct 2022 16:06)  T(F): 98.6 (25 Oct 2022 10:27), Max: 100.3 (24 Oct 2022 16:06)  HR: 122 (25 Oct 2022 10:27) (119 - 160)  BP: 120/79 (25 Oct 2022 10:27) (120/79 - 139/88)  BP(mean): --  RR: 18 (25 Oct 2022 10:27) (17 - 18)  SpO2: 98% (25 Oct 2022 10:27) (96% - 98%)    Parameters below as of 25 Oct 2022 10:27  Patient On (Oxygen Delivery Method): nasal cannula        PHYSICAL EXAM:  GENERAL: NAD, well-groomed, well-developed  HEENT : Conjuntivae  clear sclerae anicteric  NECK: Supple, No JVD, Normal thyroid  NERVOUS SYSTEM:  Alert oriented   no  focal  deficits;   CHEST/LUNG: Clear    HEART: Regular rate and rhythm; No murmurs, rubs, or gallops  ABDOMEN: Soft, Nontender, Nondistended; Bowel sounds present  EXTREMITIES:  no  edema no  tenderness  SKIN: No rashes   LABS:                        11.5   8.03  )-----------( 106      ( 25 Oct 2022 07:02 )             35.8     10-    141  |  108  |  17  ----------------------------<  94  3.7   |  26  |  0.77    Ca    8.8      25 Oct 2022 07:02  Mg     2.0     10-    TPro  6.6  /  Alb  2.8<L>  /  TBili  0.8  /  DBili  x   /  AST  28  /  ALT  75  /  AlkPhos  73  10-    PTT - ( 25 Oct 2022 07:02 )  PTT:90.7 sec  Urinalysis Basic - ( 23 Oct 2022 14:35 )    Color: Yellow / Appearance: Clear / S.015 / pH: x  Gluc: x / Ketone: Negative  / Bili: Negative / Urobili: Negative mg/dL   Blood: x / Protein: 30 mg/dL / Nitrite: Negative   Leuk Esterase: Small / RBC: Negative /HPF / WBC 3-5   Sq Epi: x / Non Sq Epi: Few / Bacteria: Many      CAPILLARY BLOOD GLUCOSE          RADIOLOGY & ADDITIONAL TESTS:    Imaging reports  Personally Reviewed:  [ ] YES  [ ] NO    Consultant(s) Notes Reviewed:  [x ] YES  [ ] NO    Care Discussed with Consultants/Other Providers [ x] YES  [ ] NO    Problem/Plan - 1:  ·  Problem: Atrial fibrillation, new onset.   ·  Plan: monitor  on  telemetry  full AC  rate  control  TTE  trend  troponin  cardiology  eval. appreciated    Problem/Plan - 2:  ·  Problem: HTN (hypertension).   ·  Plan: metoprolol.    Problem/Plan - 3:  ·  Problem: Insomnia.   ·  Plan: melatonin.  Anemia  monitor  H/h  continue  AC  further  w/u  and  Rx  as per  clincal course

## 2022-10-25 NOTE — PROGRESS NOTE ADULT - ASSESSMENT
89 yo female with h/o HTN, brought in for intermittent weakness. Near syncope x 1, no LOC.  Found to be in AF, rates rapid at times.  On IV heparin, given bbl. would titrate bbl to achieve VR's<100.    PLAN  Metoprolol frequency increased to Q8H for better HR control  Doubt candidate for long term AC, weakness/ prone to fall and evidence of anemia. Given advanced age, little to gain from long term AC, but will withhold decision pending echo results.  Elevated cardiac enzymes likely indicative of demand ischemia rather than ACS, would not pursue ischemia evaluation as long as she is asymptomatic.  Ambulate and assess for orthostasis.  Etiology of anemia? Check iron indices. B12 levels. Occult blood?  CTA reports no PE

## 2022-10-25 NOTE — PROGRESS NOTE ADULT - NS ATTEND AMEND GEN_ALL_CORE FT
89-year-old with hypertension A. fib with RVR and small bilateral pleural effusions.  Elevated troponin likely demand ischemia response.  Weakness and near syncope noted.  Beta-blocker increased for better heart rate management.  Try to avoid anticoagulation given fall risk.  Echo pending.  Follow on telemetry.

## 2022-10-26 DIAGNOSIS — R53.1 WEAKNESS: ICD-10-CM

## 2022-10-26 DIAGNOSIS — Z51.5 ENCOUNTER FOR PALLIATIVE CARE: ICD-10-CM

## 2022-10-26 LAB
ANION GAP SERPL CALC-SCNC: 8 MMOL/L — SIGNIFICANT CHANGE UP (ref 5–17)
APTT BLD: 39.6 SEC — HIGH (ref 27.5–35.5)
APTT BLD: 76 SEC — HIGH (ref 27.5–35.5)
BUN SERPL-MCNC: 20 MG/DL — SIGNIFICANT CHANGE UP (ref 7–23)
CALCIUM SERPL-MCNC: 9 MG/DL — SIGNIFICANT CHANGE UP (ref 8.5–10.1)
CHLORIDE SERPL-SCNC: 107 MMOL/L — SIGNIFICANT CHANGE UP (ref 96–108)
CO2 SERPL-SCNC: 25 MMOL/L — SIGNIFICANT CHANGE UP (ref 22–31)
CREAT SERPL-MCNC: 0.72 MG/DL — SIGNIFICANT CHANGE UP (ref 0.5–1.3)
EGFR: 77 ML/MIN/1.73M2 — SIGNIFICANT CHANGE UP
GLUCOSE SERPL-MCNC: 88 MG/DL — SIGNIFICANT CHANGE UP (ref 70–99)
HCT VFR BLD CALC: 34.3 % — LOW (ref 34.5–45)
HGB BLD-MCNC: 11.1 G/DL — LOW (ref 11.5–15.5)
MCHC RBC-ENTMCNC: 32.4 G/DL — SIGNIFICANT CHANGE UP (ref 32–36)
MCHC RBC-ENTMCNC: 32.8 PG — SIGNIFICANT CHANGE UP (ref 27–34)
MCV RBC AUTO: 101.5 FL — HIGH (ref 80–100)
NRBC # BLD: 0 /100 WBCS — SIGNIFICANT CHANGE UP (ref 0–0)
PLATELET # BLD AUTO: 103 K/UL — LOW (ref 150–400)
POTASSIUM SERPL-MCNC: 4 MMOL/L — SIGNIFICANT CHANGE UP (ref 3.5–5.3)
POTASSIUM SERPL-SCNC: 4 MMOL/L — SIGNIFICANT CHANGE UP (ref 3.5–5.3)
RBC # BLD: 3.38 M/UL — LOW (ref 3.8–5.2)
RBC # FLD: 13.9 % — SIGNIFICANT CHANGE UP (ref 10.3–14.5)
SODIUM SERPL-SCNC: 140 MMOL/L — SIGNIFICANT CHANGE UP (ref 135–145)
WBC # BLD: 5.77 K/UL — SIGNIFICANT CHANGE UP (ref 3.8–10.5)
WBC # FLD AUTO: 5.77 K/UL — SIGNIFICANT CHANGE UP (ref 3.8–10.5)

## 2022-10-26 PROCEDURE — 99497 ADVNCD CARE PLAN 30 MIN: CPT | Mod: 25

## 2022-10-26 PROCEDURE — 99223 1ST HOSP IP/OBS HIGH 75: CPT

## 2022-10-26 RX ORDER — METOPROLOL TARTRATE 50 MG
100 TABLET ORAL EVERY 12 HOURS
Refills: 0 | Status: DISCONTINUED | OUTPATIENT
Start: 2022-10-26 | End: 2022-10-31

## 2022-10-26 RX ORDER — APIXABAN 2.5 MG/1
2.5 TABLET, FILM COATED ORAL
Refills: 0 | Status: DISCONTINUED | OUTPATIENT
Start: 2022-10-26 | End: 2022-10-29

## 2022-10-26 RX ORDER — ASPIRIN/CALCIUM CARB/MAGNESIUM 324 MG
81 TABLET ORAL DAILY
Refills: 0 | Status: DISCONTINUED | OUTPATIENT
Start: 2022-10-26 | End: 2022-10-26

## 2022-10-26 RX ADMIN — HEPARIN SODIUM 500 UNIT(S)/HR: 5000 INJECTION INTRAVENOUS; SUBCUTANEOUS at 07:15

## 2022-10-26 RX ADMIN — HEPARIN SODIUM 500 UNIT(S)/HR: 5000 INJECTION INTRAVENOUS; SUBCUTANEOUS at 00:27

## 2022-10-26 RX ADMIN — Medication 50 MILLIGRAM(S): at 14:33

## 2022-10-26 RX ADMIN — Medication 100 MILLIGRAM(S): at 17:53

## 2022-10-26 RX ADMIN — Medication 50 MILLIGRAM(S): at 05:25

## 2022-10-26 RX ADMIN — APIXABAN 2.5 MILLIGRAM(S): 2.5 TABLET, FILM COATED ORAL at 10:19

## 2022-10-26 RX ADMIN — APIXABAN 2.5 MILLIGRAM(S): 2.5 TABLET, FILM COATED ORAL at 21:57

## 2022-10-26 RX ADMIN — HEPARIN SODIUM 450 UNIT(S)/HR: 5000 INJECTION INTRAVENOUS; SUBCUTANEOUS at 08:29

## 2022-10-26 NOTE — PROGRESS NOTE ADULT - SUBJECTIVE AND OBJECTIVE BOX
Patient is a 89y old  Female who presents with a chief complaint of dizzines new  onst  afib  with  rvr (26 Oct 2022 13:13)      PAST MEDICAL & SURGICAL HISTORY:  Anxiety      HTN (hypertension)      No significant past surgical history      INTERVAL HISTORY: TTE still pending  	  MEDICATIONS:  MEDICATIONS  (STANDING):  apixaban 2.5 milliGRAM(s) Oral two times a day  melatonin 3 milliGRAM(s) Oral at bedtime  metoprolol tartrate 100 milliGRAM(s) Oral every 12 hours    MEDICATIONS  (PRN):      Vitals:  T(F): 97.2 (10-26-22 @ 11:11), Max: 99.2 (10-25-22 @ 21:00)  HR: 128 (10-26-22 @ 14:27) (122 - 128)  BP: 108/68 (10-26-22 @ 14:27) (108/68 - 130/87)  RR: 18 (10-26-22 @ 11:11) (18 - 19)  SpO2: 100% (10-26-22 @ 11:11) (97% - 100%)  Wt(kg): --    10-25 @ 07:01  -  10-26 @ 07:00  --------------------------------------------------------  IN:    Oral Fluid: 240 mL  Total IN: 240 mL    OUT:    Voided (mL): 1350 mL  Total OUT: 1350 mL    Total NET: -1110 mL      Weight (kg): 56.4 (10-23 @ 23:12)  BMI (kg/m2): 22.7 (10-23 @ 23:12)      PHYSICAL EXAM:  Neuro: Awake, responsive  CV: S1 S2 RRR  Lungs: CTABL  GI: Soft, BS +, ND, NT  Extremities: No edema    TELEMETRY: Afib/ST  	       RADIOLOGY: < from: CT Angio Chest PE Protocol w/ IV Cont (10.25.22 @ 10:01) >  FINDINGS:    LUNGS AND AIRWAYS: Patent central airways.  Bilateral dependent and   basilar atelectasis. Mild ground glass opacities, possibly edema.  PLEURA: Small bilateral pleural effusions.  MEDIASTINUM AND ARCADIO: No lymphadenopathy.  VESSELS: Atherosclerotic calcifications of thoracic aorta and coronary   arteries. Multiple images are degraded by respiratory motion. Excluding   regions with motion artifact, there are no pulmonary arterial filling   defects identified.  HEART: Mild cardiomegaly. No pericardial effusion.  CHEST WALL AND LOWER NECK: Subcutaneous soft tissue edema.  VISUALIZED UPPER ABDOMEN: Reflux of intravenous contrast into the   inferior vena cava and hepatic veins. Mild adrenal thickening.  BONES: Degenerative changes.    IMPRESSION:  Negative for pulmonary embolism.  Small bilateral pleural effusions.      < end of copied text >      DIAGNOSTIC TESTING:    [x  ] Echocardiogram: TTE PENDING  [ ]  Catheterization:  [ ] Stress Test:    OTHER: 	2    LABS:	 	    CARDIAC MARKERS:  Troponin I, Serum: 113.200 ng/mL (10-24 @ 06:55)          26 Oct 2022 06:40    140    |  107    |  20     ----------------------------<  88     4.0     |  25     |  0.72   25 Oct 2022 07:02    141    |  108    |  17     ----------------------------<  94     3.7     |  26     |  0.77   24 Oct 2022 06:55    142    |  111    |  12     ----------------------------<  91     3.5     |  24     |  0.77     Ca    9.0        26 Oct 2022 06:40    TPro  6.6    /  Alb  2.8    /  TBili  0.8    /  DBili  x      /  AST  28     /  ALT  75     /  AlkPhos  73     25 Oct 2022 07:02                          11.1   5.77  )-----------( 103      ( 26 Oct 2022 06:40 )             34.3 ,                       11.5   8.03  )-----------( 106      ( 25 Oct 2022 07:02 )             35.8 ,                       10.8   7.90  )-----------( 118      ( 24 Oct 2022 06:55 )             33.6 ,                       10.6   6.58  )-----------( 112      ( 23 Oct 2022 21:24 )             32.9   proBNP:   Lipid Profile:   HgA1c:   TSH: Thyroid Stimulating Hormone, Serum: 0.865 uIU/mL (10-23 @ 17:46)      PT/PTT- ( 26 Oct 2022 06:40 )   PT: x    ;  PTT: 76.0 sec       PT/PTT- ( 25 Oct 2022 22:55 )   PT: x    ;  PTT: 64.6 sec       PT/PTT- ( 25 Oct 2022 16:00 )   PT: x    ;  PTT: 40.5 sec

## 2022-10-26 NOTE — CONSULT NOTE ADULT - SUBJECTIVE AND OBJECTIVE BOX
HPI:  : 89-year-old female history of hypertension presents for weakness.  Patient with neighbors who states that patient was at home by herself   and called in after he was weak starting this morning.  Neighbor also states that she is been having intermittent weakness of the last week.  Patient denies any fevers chills or other complaints.  Patient states due to weakness she lowered herself to the floor first on her knees but denies hitting her head and denies any injury that she lowered herself to the floor slowly.  Patient denies any chest pain shortness of breath palpitations  dizziness lightheadedness abdominal pain GI symptoms.     (23 Oct 2022 15:51)    PERTINENT PM/SXH:   Anxiety    HTN (hypertension)      No significant past surgical history      FAMILY HISTORY:    ITEMS NOT CHECKED ARE NOT PRESENT    SOCIAL HISTORY:   Significant other/partner:  [ ]  Children:  [ ]  Tenriism/Spirituality: Taoism  Substance hx:  [ ]   Tobacco hx:  [ ]   Alcohol hx: [ ]   Home Opioid hx:  [ ] I-Stop Reference No: Reference #: [id number]  Living Situation: [x ]Home  [ ]Long term care  [ ]Rehab [ ]Other    ADVANCE DIRECTIVES:    DNR  MOLST  [ x]  Living Will  [ ]   DECISION MAKER(s):  [x ] Health Care Proxy(s)  [ ] Surrogate(s)  [ ] Guardian           Name(s): Phone Number(s): Kb Fofana [phone]    BASELINE (I)ADL(s) (prior to admission):  [place]: [x ]Total  [ ] Moderate [ ]Dependent    Allergies    No Known Allergies    Intolerances    MEDICATIONS  (STANDING):  apixaban 2.5 milliGRAM(s) Oral two times a day  melatonin 3 milliGRAM(s) Oral at bedtime  metoprolol tartrate 50 milliGRAM(s) Oral every 8 hours    MEDICATIONS  (PRN):    PRESENT SYMPTOMS: [ ]Unable to obtain due to poor mentation   Source if other than patient:  [ ]Family   [ ]Team     Pain: [ ] yes [x ] no  QOL impact -   Location -                    Aggravating factors -  Quality -  Radiation -  Timing-  Severity (0-10 scale):  Minimal acceptable level (0-10 scale):     PAIN AD Score:     [email or web address]/[email or web address] (press ctrl +  left click to view)    Dyspnea:                           [ ]Mild [ ]Moderate [ ]Severe  Anxiety:                             [ ]Mild [ ]Moderate [ ]Severe  Fatigue:                             [x ]Mild [ ]Moderate [ ]Severe  Nausea:                             [ ]Mild [ ]Moderate [ ]Severe  Loss of appetite:              [ ]Mild [ ]Moderate [ ]Severe  Constipation:                    [ ]Mild [ ]Moderate [ ]Severe    Other Symptoms:  [x ]All other review of systems negative     Karnofsky Performance Score/Palliative Performance Status Version 2:        60 %    [email or web address]_[email or web address]  PHYSICAL EXAM:  Vital Signs Last 24 Hrs  T(C): 36.2 (26 Oct 2022 11:11), Max: 37.3 (25 Oct 2022 21:00)  T(F): 97.2 (26 Oct 2022 11:11), Max: 99.2 (25 Oct 2022 21:00)  HR: 125 (26 Oct 2022 11:11) (122 - 125)  BP: 112/71 (26 Oct 2022 11:11) (112/71 - 130/87)  BP(mean): --  RR: 18 (26 Oct 2022 11:11) (18 - 19)  SpO2: 100% (26 Oct 2022 11:11) (97% - 100%)    Parameters below as of 26 Oct 2022 11:11  Patient On (Oxygen Delivery Method): nasal cannula     I&O's Summary    25 Oct 2022 07:01  -  26 Oct 2022 07:00  --------------------------------------------------------  IN: 240 mL / OUT: 1350 mL / NET: -1110 mL    GENERAL:  [x]Alert  [ x]Oriented x4   [ ]Lethargic  [ ]Cachexia  [ ]Unarousable  [ x]Verbal  [ ]Non-Verbal  Behavioral:   [ ] Anxiety  [ ] Delirium [ ] Agitation [ ] Other  HEENT:  [ x]Normal   [ ]Dry mouth   [ ]ET Tube/Trach  [ ]Oral lesions  PULMONARY:   [ ]Clear [ ]Tachypnea  [ ]Audible excessive secretions   [ ]Rhonchi        [ ]Right [ ]Left [ ]Bilateral  [ ]Crackles        [ ]Right [ ]Left [ ]Bilateral  [ ]Wheezing     [ ]Right [ ]Left [ ]Bilateral  CARDIOVASCULAR:    [ ]Regular [ ]Irregular [ ]Tachy  [ ]Ronaldo [ ]Murmur [ ]Other  GASTROINTESTINAL:  [x ]Soft  [ ]Distended   [ ]+BS  [ ]Non tender [ ]Tender  [ ]PEG [ ]OGT/ NGT  Last BM: 10/26/22 GENITOURINARY:  [x ]Normal [ ] Incontinent   [ ]Oliguria/Anuria   [ ]Sandhu  MUSCULOSKELETAL:   [ ]Normal   [x ]Weakness  [ ]Bed/Wheelchair bound [ ]Edema  NEUROLOGIC:   [x ]No focal deficits  [ ] Cognitive impairment  [ ] Dysphagia [ ]Dysarthria [ ] Paresis [ ]Other   SKIN:   [x ]Normal   [ ]Pressure ulcer(s)  [ ]Rash    CRITICAL CARE:  [ ] Shock Present  [ ]Septic [ ]Cardiogenic [ ]Neurologic [ ]Hypovolemic  [ ]  Vasopressors [ ]  Inotropes   [ ] Respiratory failure present [ ] mechanical ventilation [ ] non-invasive ventilatory support [ ] High flow  [ ] Acute  [ ] Chronic [ ] Hypoxic  [ ] Hypercarbic [ ] Other  [ ] Other organ failure     LABS:                        11.1   5.77  )-----------( 103      ( 26 Oct 2022 06:40 )             34.3   10-26    140  |  107  |  20  ----------------------------<  88  4.0   |  25  |  0.72    Ca    9.0      26 Oct 2022 06:40    TPro  6.6  /  Alb  2.8<L>  /  TBili  0.8  /  DBili  x   /  AST  28  /  ALT  75  /  AlkPhos  73  10-25  PTT - ( 26 Oct 2022 06:40 )  PTT:76.0 sec    Culture - Urine (10.23.22 @ 14:35)    Specimen Source: Clean Catch Clean Catch (Midstream)    Culture Results:   <10,000 CFU/mL Normal Urogenital Bee    Urinalysis (10.23.22 @ 14:35)    Glucose Qualitative, Urine: Negative mg/dL    Blood, Urine: Negative    pH Urine: 5.0    Color: Yellow    Urine Appearance: Clear    Bilirubin: Negative    Ketone - Urine: Negative    Specific Gravity: 1.015    Protein, Urine: 30 mg/dL    Urobilinogen: Negative mg/dL    Nitrite: Negative    Leukocyte Esterase Concentration: Small    Culture - Blood (10.23.22 @ 10:15)    Specimen Source: .Blood Blood-Peripheral    Culture Results:   No growth to date.    Thyroid Stimulating Hormone, Serum (10.23.22 @ 17:46)    Thyroid Stimulating Hormone, Serum: 0.865 uIU/mL    RADIOLOGY & ADDITIONAL STUDIES:   < from: CT Angio Chest PE Protocol w/ IV Cont (10.25.22 @ 10:01) >  IMPRESSION:  Negative for pulmonary embolism.  Small bilateral pleural effusions.  --- End of Report ---  < end of copied text >    < from: Xray Chest 1 View- PORTABLE-Urgent (Xray Chest 1 View- PORTABLE-Urgent .) (10.24.22 @ 18:53) >  INTERPRETATION:  AP chest on October 24, 2022 at 6:11 PM. Patient is   hypoxic.  Heart likely enlarged.  There is a small left base infiltrate medially increased from October 23.  IMPRESSION: As above.  --- End of Report ---  < end of copied text >    < from: CT Head No Cont (10.23.22 @ 13:01) >  IMPRESSION:  No acute intracranial bleeding.  --- End of Report ---  < end of copied text >    < from: Xray Chest 1 View- PORTABLE-Urgent (10.23.22 @ 10:44) >  Impression:  No acute pulmonary disease.  --- End of Report ---  < end of copied text >    PROTEIN CALORIE MALNUTRITION PRESENT: [ ] Yes [ x] No  [ ] PPSV2 < or = to 30% [ ] significant weight loss  [ ] poor nutritional intake [ ] catabolic state [ ] anasarca     Artificial Nutrition [ ]     REFERRALS:   [ ]Chaplaincy  [ ] Hospice  [ ]Child Life  [ ]Social Work  [ ]Case management [ ]Holistic Therapy     Goals of Care Document:

## 2022-10-26 NOTE — PROGRESS NOTE ADULT - ASSESSMENT
89-year-old with hypertension A. fib with RVR and small bilateral pleural effusions;  brought in for intermittent weakness. Near syncope x 1, no LOC.    Elevated troponin likely demand ischemia response.  Weakness and near syncope noted.        Found to be in AF, rates rapid at times.  On IV heparin, given bbl. would titrate bbl to achieve VR's<100.    PLAN  Echo pending.  Follow on telemetry.  Metoprolol  increased to 100 mg Q12H for better HR control  Try to avoid anticoagulation given fall risk, weakness, and advanced age. Given advanced age, little to gain from long term AC.  Elevated cardiac enzymes likely indicative of demand ischemia rather than ACS, would not pursue ischemia evaluation as long as she is asymptomatic.  Ambulate and assess for orthostasis.  CTA reports no PE  Stop IV Heparin, may give baby ASA

## 2022-10-26 NOTE — CHART NOTE - NSCHARTNOTEFT_GEN_A_CORE
Palliative to sign off as GOC are clear at this time.  Please reconsult should GOC change or symptoms arise.

## 2022-10-26 NOTE — CONSULT NOTE ADULT - CONVERSATION DETAILS
Spoke with patient who confirmed her nephew, Richardson is her decision maker and said she had a HPC, but informed her there was not one on the chart.  She was agreeable to completing a new HCP form.  She also noted she had advanced directives prior indicating no resuscitation.  She said if it is her time, to let her go and wants to be peaceful.  Discussed MOLST form drafted MOLST indicating DNR/I/No tube feeds.  HCP form and MOLST placed on chart.

## 2022-10-26 NOTE — CONSULT NOTE ADULT - ASSESSMENT
89 year old female PMH of HTN presented to the ED from home for weakness.  Patient was in the kitchen when she felt weak and lowered herself to the floor, denies head trauma.  Patient found to be in atrial fib with RVR and started on heparin gtt and lopressor.   Palliative care consulted for GOC.

## 2022-10-26 NOTE — CONSULT NOTE ADULT - PROBLEM SELECTOR RECOMMENDATION 9
was on heparin gtt, now discontinued and placed on eliquis.  Likely not a good candidate for long-term AC given fall risk   on metoprolol dose increased to 3 times per day, remains tachycardic, on tele  pending echo  on nasal cannula, denies respiratory distress, titrate down as tolerated, patient not previously on oxygen was on heparin gtt, now discontinued and placed on eliquis.  Likely not a good candidate for long-term AC given fall risk   TSH WNL  on metoprolol dose increased to 3 times per day, remains tachycardic, on tele  pending echo  on nasal cannula, denies respiratory distress, titrate down as tolerated, patient not previously on oxygen

## 2022-10-26 NOTE — CONSULT NOTE ADULT - PROBLEM SELECTOR RECOMMENDATION 3
See GOC above.  Patient completed HCP form, nephew, Richardson to be her HCP and MOLST on file indicating DNR/I/No feeding tube. See GOC above.  Patient completed HCP form, nephewRichardson to be her HCP and MOLST on file indicating DNR/I/No feeding tube.  Patient asked that I relay her wishes with her nephew as well.  Called and spoke with Richardson and informed him of his aunt's wishes.  He said he was familiar because he spoke with her earlier and she stated the same.

## 2022-10-27 LAB
ALBUMIN SERPL ELPH-MCNC: 2.8 G/DL — LOW (ref 3.3–5)
ALP SERPL-CCNC: 67 U/L — SIGNIFICANT CHANGE UP (ref 40–120)
ALT FLD-CCNC: 51 U/L — SIGNIFICANT CHANGE UP (ref 12–78)
ANION GAP SERPL CALC-SCNC: 7 MMOL/L — SIGNIFICANT CHANGE UP (ref 5–17)
AST SERPL-CCNC: 21 U/L — SIGNIFICANT CHANGE UP (ref 15–37)
BILIRUB SERPL-MCNC: 0.6 MG/DL — SIGNIFICANT CHANGE UP (ref 0.2–1.2)
BUN SERPL-MCNC: 27 MG/DL — HIGH (ref 7–23)
CALCIUM SERPL-MCNC: 9.1 MG/DL — SIGNIFICANT CHANGE UP (ref 8.5–10.1)
CHLORIDE SERPL-SCNC: 106 MMOL/L — SIGNIFICANT CHANGE UP (ref 96–108)
CO2 SERPL-SCNC: 27 MMOL/L — SIGNIFICANT CHANGE UP (ref 22–31)
CREAT SERPL-MCNC: 0.9 MG/DL — SIGNIFICANT CHANGE UP (ref 0.5–1.3)
EGFR: 59 ML/MIN/1.73M2 — LOW
GLUCOSE SERPL-MCNC: 96 MG/DL — SIGNIFICANT CHANGE UP (ref 70–99)
HCT VFR BLD CALC: 36.3 % — SIGNIFICANT CHANGE UP (ref 34.5–45)
HGB BLD-MCNC: 11.4 G/DL — LOW (ref 11.5–15.5)
MCHC RBC-ENTMCNC: 31.4 G/DL — LOW (ref 32–36)
MCHC RBC-ENTMCNC: 32.4 PG — SIGNIFICANT CHANGE UP (ref 27–34)
MCV RBC AUTO: 103.1 FL — HIGH (ref 80–100)
NRBC # BLD: 0 /100 WBCS — SIGNIFICANT CHANGE UP (ref 0–0)
PLATELET # BLD AUTO: 126 K/UL — LOW (ref 150–400)
POTASSIUM SERPL-MCNC: 4.2 MMOL/L — SIGNIFICANT CHANGE UP (ref 3.5–5.3)
POTASSIUM SERPL-SCNC: 4.2 MMOL/L — SIGNIFICANT CHANGE UP (ref 3.5–5.3)
PROT SERPL-MCNC: 6.6 GM/DL — SIGNIFICANT CHANGE UP (ref 6–8.3)
RBC # BLD: 3.52 M/UL — LOW (ref 3.8–5.2)
RBC # FLD: 13.9 % — SIGNIFICANT CHANGE UP (ref 10.3–14.5)
SODIUM SERPL-SCNC: 140 MMOL/L — SIGNIFICANT CHANGE UP (ref 135–145)
WBC # BLD: 6.33 K/UL — SIGNIFICANT CHANGE UP (ref 3.8–10.5)
WBC # FLD AUTO: 6.33 K/UL — SIGNIFICANT CHANGE UP (ref 3.8–10.5)

## 2022-10-27 RX ORDER — ACETAMINOPHEN 500 MG
650 TABLET ORAL EVERY 6 HOURS
Refills: 0 | Status: DISCONTINUED | OUTPATIENT
Start: 2022-10-27 | End: 2022-10-31

## 2022-10-27 RX ORDER — MAGNESIUM SULFATE 500 MG/ML
1 VIAL (ML) INJECTION ONCE
Refills: 0 | Status: COMPLETED | OUTPATIENT
Start: 2022-10-27 | End: 2022-10-27

## 2022-10-27 RX ORDER — DILTIAZEM HCL 120 MG
30 CAPSULE, EXT RELEASE 24 HR ORAL THREE TIMES A DAY
Refills: 0 | Status: DISCONTINUED | OUTPATIENT
Start: 2022-10-27 | End: 2022-10-28

## 2022-10-27 RX ADMIN — Medication 650 MILLIGRAM(S): at 01:15

## 2022-10-27 RX ADMIN — Medication 30 MILLIGRAM(S): at 16:02

## 2022-10-27 RX ADMIN — APIXABAN 2.5 MILLIGRAM(S): 2.5 TABLET, FILM COATED ORAL at 09:54

## 2022-10-27 RX ADMIN — Medication 3 MILLIGRAM(S): at 21:42

## 2022-10-27 RX ADMIN — Medication 100 MILLIGRAM(S): at 05:28

## 2022-10-27 RX ADMIN — Medication 100 GRAM(S): at 09:54

## 2022-10-27 RX ADMIN — Medication 30 MILLIGRAM(S): at 11:26

## 2022-10-27 RX ADMIN — APIXABAN 2.5 MILLIGRAM(S): 2.5 TABLET, FILM COATED ORAL at 21:41

## 2022-10-27 RX ADMIN — Medication 30 MILLIGRAM(S): at 21:40

## 2022-10-27 RX ADMIN — Medication 650 MILLIGRAM(S): at 00:15

## 2022-10-27 RX ADMIN — Medication 100 MILLIGRAM(S): at 17:56

## 2022-10-27 NOTE — PHYSICAL THERAPY INITIAL EVALUATION ADULT - ADDITIONAL COMMENTS
pt leaves alone in a private home with no steps to enter or inside the house. pt independent in all ADL and ambulation prior to hospitalization. pt require min a for bed mob and transfers, pt able to amb with RW x 40ft, presents with mod unsteady gait, pt reports decrease in sensation to caitlin feet sec to shingles. educated on proper gait and balance. pt heart rate during amb is 124 bpm.

## 2022-10-27 NOTE — PROGRESS NOTE ADULT - SUBJECTIVE AND OBJECTIVE BOX
Patient is a 89y old  Female who presents with a chief complaint of dizziness new  onset  afib  with  rvr (27 Oct 2022 10:23)      PAST MEDICAL & SURGICAL HISTORY:  Anxiety      HTN (hypertension)      No significant past surgical history      INTERVAL HISTORY: Patient in bed, no c/o offered. Started on Cardizem today.  	  MEDICATIONS:  MEDICATIONS  (STANDING):  apixaban 2.5 milliGRAM(s) Oral two times a day  diltiazem    Tablet 30 milliGRAM(s) Oral three times a day  melatonin 3 milliGRAM(s) Oral at bedtime  metoprolol tartrate 100 milliGRAM(s) Oral every 12 hours    MEDICATIONS  (PRN):  acetaminophen     Tablet .. 650 milliGRAM(s) Oral every 6 hours PRN Mild Pain (1 - 3)      Vitals:  T(F): 97.8 (10-27-22 @ 10:27), Max: 98.6 (10-26-22 @ 16:20)  HR: 122 (10-27-22 @ 10:27) (118 - 126)  BP: 118/80 (10-27-22 @ 10:27) (112/73 - 147/99)  RR: 18 (10-27-22 @ 10:27) (18 - 18)  SpO2: 94% (10-27-22 @ 10:27) (94% - 95%)  Wt(kg): --    10-26 @ 07:01  -  10-27 @ 07:00  --------------------------------------------------------  IN:  Total IN: 0 mL    OUT:    Voided (mL): 450 mL  Total OUT: 450 mL    Total NET: -450 mL    PHYSICAL EXAM:  Neuro: Awake, responsive  CV: S1 S2 irregular   Lungs: CTABL  GI: Soft, BS +, ND, NT  Extremities: No edema    TELEMETRY: ST/A fib  	    ECG:  	    RADIOLOGY: < from: CT Angio Chest PE Protocol w/ IV Cont (10.25.22 @ 10:01) >  FINDINGS:    LUNGS AND AIRWAYS: Patent central airways.  Bilateral dependent and   basilar atelectasis. Mild ground glass opacities, possibly edema.  PLEURA: Small bilateral pleural effusions.  MEDIASTINUM AND ARCADIO: No lymphadenopathy.  VESSELS: Atherosclerotic calcifications of thoracic aorta and coronary   arteries. Multiple images are degraded by respiratory motion. Excluding   regions with motion artifact, there are no pulmonary arterial filling   defects identified.  HEART: Mild cardiomegaly. No pericardial effusion.  CHEST WALL AND LOWER NECK: Subcutaneous soft tissue edema.  VISUALIZED UPPER ABDOMEN: Reflux of intravenous contrast into the   inferior vena cava and hepatic veins. Mild adrenal thickening.  BONES: Degenerative changes.    IMPRESSION:  Negative for pulmonary embolism.  Small bilateral pleural effusions.    < end of copied text >      DIAGNOSTIC TESTING:    [x ] Echocardiogram: Pending       LABS:	 	    CARDIAC MARKERS:      27 Oct 2022 05:55    140    |  106    |  27     ----------------------------<  96     4.2     |  27     |  0.90   26 Oct 2022 06:40    140    |  107    |  20     ----------------------------<  88     4.0     |  25     |  0.72   25 Oct 2022 07:02    141    |  108    |  17     ----------------------------<  94     3.7     |  26     |  0.77     Ca    9.1        27 Oct 2022 05:55    TPro  6.6    /  Alb  2.8    /  TBili  0.6    /  DBili  x      /  AST  21     /  ALT  51     /  AlkPhos  67     27 Oct 2022 05:55                          11.4   6.33  )-----------( 126      ( 27 Oct 2022 05:55 )             36.3 ,                       11.1   5.77  )-----------( 103      ( 26 Oct 2022 06:40 )             34.3 ,                       11.5   8.03  )-----------( 106      ( 25 Oct 2022 07:02 )             35.8   proBNP:   Lipid Profile:   HgA1c:   TSH: Thyroid Stimulating Hormone, Serum: 0.865 uIU/mL (10-23 @ 17:46)      PT/PTT- ( 26 Oct 2022 15:11 )   PT: x    ;  PTT: 39.6 sec

## 2022-10-27 NOTE — PROGRESS NOTE ADULT - SUBJECTIVE AND OBJECTIVE BOX
INTERVAL HPI/OVERNIGHT EVENTS:        REVIEW OF SYSTEMS:  CONSTITUTIONAL:  no  complaints    NECK: No pain or stiffnes  RESPIRATORY: No SOB   CARDIOVASCULAR: No chest pain, palpitations, dizziness,   GASTROINTESTINAL: No abdominal pain. No nausea, vomiting,   NEUROLOGICAL: No headaches, no  blurry  vision no  dizziness  SKIN: No itching,   MUSCULOSKELETAL: No pain    MEDICATION:  acetaminophen     Tablet .. 650 milliGRAM(s) Oral every 6 hours PRN  apixaban 2.5 milliGRAM(s) Oral two times a day  melatonin 3 milliGRAM(s) Oral at bedtime  metoprolol tartrate 100 milliGRAM(s) Oral every 12 hours    Vital Signs Last 24 Hrs  T(C): 36.4 (27 Oct 2022 04:54), Max: 37 (26 Oct 2022 16:20)  T(F): 97.5 (27 Oct 2022 04:54), Max: 98.6 (26 Oct 2022 16:20)  HR: 118 (27 Oct 2022 07:02) (118 - 128)  BP: 147/99 (27 Oct 2022 04:54) (108/68 - 147/99)  BP(mean): 17 (26 Oct 2022 16:20) (17 - 17)  RR: 18 (27 Oct 2022 04:54) (18 - 18)  SpO2: 95% (27 Oct 2022 04:54) (94% - 100%)    Parameters below as of 27 Oct 2022 04:54  Patient On (Oxygen Delivery Method): nasal cannula        PHYSICAL EXAM:  GENERAL: NAD, well-groomed, well-developed  HEENT : Conjuntivae  clear sclerae anicteric  NECK: Supple, No JVD, Normal thyroid  NERVOUS SYSTEM:  Alert oriented   no  focal  deficits;   CHEST/LUNG: Clear    HEART: IRRegular rate and rhythm; No murmurs, rubs, or gallops  ABDOMEN: Soft, Nontender, Nondistended; Bowel sounds present  EXTREMITIES:  no  edema no  tenderness  SKIN: No rashes   LABS:                        11.4   6.33  )-----------( 126      ( 27 Oct 2022 05:55 )             36.3     10-27    140  |  106  |  27<H>  ----------------------------<  96  4.2   |  27  |  0.90    Ca    9.1      27 Oct 2022 05:55    TPro  6.6  /  Alb  2.8<L>  /  TBili  0.6  /  DBili  x   /  AST  21  /  ALT  51  /  AlkPhos  67  10-27    PTT - ( 26 Oct 2022 15:11 )  PTT:39.6 sec    CAPILLARY BLOOD GLUCOSE          RADIOLOGY & ADDITIONAL TESTS:    Imaging reports  Personally Reviewed:  [ ] YES  [ ] NO    Consultant(s) Notes Reviewed:  [x ] YES  [ ] NO    Care Discussed with Consultants/Other Providers [x ] YES  [ ] NO  Problem/Plan - 1:  ·  Problem: Atrial fibrillation, new onset.   continues  with  RVR  an  high  dose  BB  will  add  low dose  CCB  further  adjustments  as per  clinical course    Problem/Plan - 2:  ·  Problem: HTN (hypertension).   ·  Plan: metoprolol.    Problem/Plan - 3:  ·  Problem: Insomnia.   ·  Plan: melatonin.  Anemia  monitor  H/H stable  continue  AC

## 2022-10-27 NOTE — PROGRESS NOTE ADULT - ASSESSMENT
89-year-old with hypertension A. fib with RVR and small bilateral pleural effusions;  brought in for intermittent weakness. Near syncope x 1, no LOC.    Elevated troponin likely demand ischemia response.  Weakness and near syncope noted.        Found to be in AF, rates rapid at times.  On IV heparin, given bbl. would titrate bbl to achieve VR's<100.    PLAN  Echo pending, delay due to elevated HR  Follow on telemetry.  Metoprolol  increased to 100 mg Q12H for better HR control; Cardizem added by Attending. If Hypotension is an issue can stop Cardizem and start Digoxin 0.125 mg daily.  Try to avoid anticoagulation given fall risk, weakness, and advanced age. Given advanced age, little to gain from long term AC.  Elevated cardiac enzymes likely indicative of demand ischemia rather than ACS, would not pursue ischemia evaluation as long as she is asymptomatic.  Ambulate and assess for orthostasis.  CTA reports no PE   may give baby ASA  Discussed with patient the option of transferring to [facility] for Cardioversion. Patient wants to think about it and discuss with her family. Not agreeable to transfer at this time.

## 2022-10-27 NOTE — PHYSICAL THERAPY INITIAL EVALUATION ADULT - LEVEL OF INDEPENDENCE: SIT/STAND, REHAB EVAL
If an upper endoscopy or enteroscopy was performed, you might have a sore throat for 1 to 2 days after the procedure. If it does not improve, please contact your doctor. independent

## 2022-10-28 LAB
ALBUMIN SERPL ELPH-MCNC: 2.8 G/DL — LOW (ref 3.3–5)
ALP SERPL-CCNC: 66 U/L — SIGNIFICANT CHANGE UP (ref 40–120)
ALT FLD-CCNC: 49 U/L — SIGNIFICANT CHANGE UP (ref 12–78)
ANION GAP SERPL CALC-SCNC: 6 MMOL/L — SIGNIFICANT CHANGE UP (ref 5–17)
AST SERPL-CCNC: 21 U/L — SIGNIFICANT CHANGE UP (ref 15–37)
BILIRUB SERPL-MCNC: 0.5 MG/DL — SIGNIFICANT CHANGE UP (ref 0.2–1.2)
BUN SERPL-MCNC: 22 MG/DL — SIGNIFICANT CHANGE UP (ref 7–23)
CALCIUM SERPL-MCNC: 9.1 MG/DL — SIGNIFICANT CHANGE UP (ref 8.5–10.1)
CHLORIDE SERPL-SCNC: 108 MMOL/L — SIGNIFICANT CHANGE UP (ref 96–108)
CO2 SERPL-SCNC: 28 MMOL/L — SIGNIFICANT CHANGE UP (ref 22–31)
CREAT SERPL-MCNC: 0.87 MG/DL — SIGNIFICANT CHANGE UP (ref 0.5–1.3)
CULTURE RESULTS: SIGNIFICANT CHANGE UP
CULTURE RESULTS: SIGNIFICANT CHANGE UP
EGFR: 62 ML/MIN/1.73M2 — SIGNIFICANT CHANGE UP
GLUCOSE SERPL-MCNC: 92 MG/DL — SIGNIFICANT CHANGE UP (ref 70–99)
HCT VFR BLD CALC: 37 % — SIGNIFICANT CHANGE UP (ref 34.5–45)
HGB BLD-MCNC: 11.7 G/DL — SIGNIFICANT CHANGE UP (ref 11.5–15.5)
MCHC RBC-ENTMCNC: 31.6 G/DL — LOW (ref 32–36)
MCHC RBC-ENTMCNC: 32.8 PG — SIGNIFICANT CHANGE UP (ref 27–34)
MCV RBC AUTO: 103.6 FL — HIGH (ref 80–100)
NRBC # BLD: 0 /100 WBCS — SIGNIFICANT CHANGE UP (ref 0–0)
PLATELET # BLD AUTO: 144 K/UL — LOW (ref 150–400)
POTASSIUM SERPL-MCNC: 4.3 MMOL/L — SIGNIFICANT CHANGE UP (ref 3.5–5.3)
POTASSIUM SERPL-SCNC: 4.3 MMOL/L — SIGNIFICANT CHANGE UP (ref 3.5–5.3)
PROT SERPL-MCNC: 6.7 GM/DL — SIGNIFICANT CHANGE UP (ref 6–8.3)
RBC # BLD: 3.57 M/UL — LOW (ref 3.8–5.2)
RBC # FLD: 13.9 % — SIGNIFICANT CHANGE UP (ref 10.3–14.5)
SODIUM SERPL-SCNC: 142 MMOL/L — SIGNIFICANT CHANGE UP (ref 135–145)
SPECIMEN SOURCE: SIGNIFICANT CHANGE UP
SPECIMEN SOURCE: SIGNIFICANT CHANGE UP
WBC # BLD: 6.1 K/UL — SIGNIFICANT CHANGE UP (ref 3.8–10.5)
WBC # FLD AUTO: 6.1 K/UL — SIGNIFICANT CHANGE UP (ref 3.8–10.5)

## 2022-10-28 PROCEDURE — 99232 SBSQ HOSP IP/OBS MODERATE 35: CPT

## 2022-10-28 RX ORDER — DILTIAZEM HCL 120 MG
30 CAPSULE, EXT RELEASE 24 HR ORAL EVERY 6 HOURS
Refills: 0 | Status: DISCONTINUED | OUTPATIENT
Start: 2022-10-28 | End: 2022-10-30

## 2022-10-28 RX ADMIN — Medication 100 MILLIGRAM(S): at 17:30

## 2022-10-28 RX ADMIN — Medication 100 MILLIGRAM(S): at 05:53

## 2022-10-28 RX ADMIN — Medication 650 MILLIGRAM(S): at 17:40

## 2022-10-28 RX ADMIN — Medication 30 MILLIGRAM(S): at 05:54

## 2022-10-28 RX ADMIN — Medication 650 MILLIGRAM(S): at 17:44

## 2022-10-28 RX ADMIN — Medication 3 MILLIGRAM(S): at 23:11

## 2022-10-28 RX ADMIN — APIXABAN 2.5 MILLIGRAM(S): 2.5 TABLET, FILM COATED ORAL at 12:24

## 2022-10-28 RX ADMIN — Medication 30 MILLIGRAM(S): at 12:24

## 2022-10-28 RX ADMIN — APIXABAN 2.5 MILLIGRAM(S): 2.5 TABLET, FILM COATED ORAL at 23:11

## 2022-10-28 NOTE — PROGRESS NOTE ADULT - ASSESSMENT
89-year-old with hypertension A. fib with RVR and small bilateral pleural effusions;  brought in for intermittent weakness. Near syncope x 1, no LOC.    Elevated troponin likely demand ischemia response.  Weakness and near syncope noted.        Found to be in AF, rates rapid at times.  On IV heparin, given bbl. would titrate bbl to achieve VR's<100.    PLAN  Echo pending, delay due to elevated HR  Follow on telemetry.  Metoprolol  increased to 100 mg Q12H for better HR control; Cardizem frequency increased to Q6H. If Hypotension is an issue can stop Cardizem and start Digoxin 0.125 mg daily.  Try to avoid anticoagulation given fall risk, weakness, and advanced age. Given advanced age, little to gain from long term AC.  Elevated cardiac enzymes likely indicative of demand ischemia rather than ACS, would not pursue ischemia evaluation as long as she is asymptomatic.  Ambulate and assess for orthostasis.  CTA reports no PE   may give baby ASA  Discussed with patient the option of transferring to [facility] for Cardioversion. Patient wants to think about it and discuss with her family. Not agreeable to transfer at this time.  Today had discussion with patient again about the option of cardioversion. Patient is refusing cardioversion, she does not want procedure as she is not sure how long she will live. She does not view the procedure as beneficial due to her advanced age and would rather opt for medication for the rest of her life.  Dr Adam is aware of patient's wishes for no cardioversion.  Will sign off now, please call if have questions.

## 2022-10-28 NOTE — PROGRESS NOTE ADULT - SUBJECTIVE AND OBJECTIVE BOX
Patient is a 89y old  Female who presents with a chief complaint of dizzines new  onst  afib  with  rvr (28 Oct 2022 09:58)      PAST MEDICAL & SURGICAL HISTORY:  Anxiety      HTN (hypertension)      No significant past surgical history      INTERVAL HISTORY: Patient refusing cardioversion.  	  MEDICATIONS:  MEDICATIONS  (STANDING):  apixaban 2.5 milliGRAM(s) Oral two times a day  diltiazem    Tablet 30 milliGRAM(s) Oral every 6 hours  melatonin 3 milliGRAM(s) Oral at bedtime  metoprolol tartrate 100 milliGRAM(s) Oral every 12 hours    MEDICATIONS  (PRN):  acetaminophen     Tablet .. 650 milliGRAM(s) Oral every 6 hours PRN Mild Pain (1 - 3)      Vitals:  T(F): 97.9 (10-28-22 @ 11:00), Max: 98.1 (10-27-22 @ 23:49)  HR: 73 (10-28-22 @ 11:00) (73 - 120)  BP: 112/71 (10-28-22 @ 11:00) (112/71 - 131/87)  RR: 18 (10-28-22 @ 11:00) (17 - 18)  SpO2: 96% (10-28-22 @ 11:00) (94% - 96%)  Wt(kg): --    10-27 @ 07:01  -  10-28 @ 07:00  --------------------------------------------------------  IN:  Total IN: 0 mL    OUT:    Voided (mL): 1600 mL  Total OUT: 1600 mL    Total NET: -1600 mL    PHYSICAL EXAM:  Neuro: Awake, responsive  CV: S1 S2 Irregular   Lungs: CTABL  GI: Soft, BS +, ND, NT  Extremities: No edema    TELEMETRY:  Afib/flutter  	    RADIOLOGY: < from: CT Angio Chest PE Protocol w/ IV Cont (10.25.22 @ 10:01) >  FINDINGS:    LUNGS AND AIRWAYS: Patent central airways.  Bilateral dependent and   basilar atelectasis. Mild ground glass opacities, possibly edema.  PLEURA: Small bilateral pleural effusions.  MEDIASTINUM AND ARCADIO: No lymphadenopathy.  VESSELS: Atherosclerotic calcifications of thoracic aorta and coronary   arteries. Multiple images are degraded by respiratory motion. Excluding   regions with motion artifact, there are no pulmonary arterial filling   defects identified.  HEART: Mild cardiomegaly. No pericardial effusion.  CHEST WALL AND LOWER NECK: Subcutaneous soft tissue edema.  VISUALIZED UPPER ABDOMEN: Reflux of intravenous contrast into the   inferior vena cava and hepatic veins. Mild adrenal thickening.  BONES: Degenerative changes.    IMPRESSION:  Negative for pulmonary embolism.  Small bilateral pleural effusions.      < end of copied text >      DIAGNOSTIC TESTING:    [x ] Echocardiogram: Pending/delayed due to elevated HR     LABS:	 	    CARDIAC MARKERS:    28 Oct 2022 05:50    142    |  108    |  22     ----------------------------<  92     4.3     |  28     |  0.87   27 Oct 2022 05:55    140    |  106    |  27     ----------------------------<  96     4.2     |  27     |  0.90   26 Oct 2022 06:40    140    |  107    |  20     ----------------------------<  88     4.0     |  25     |  0.72     Ca    9.1        28 Oct 2022 05:50    TPro  6.7    /  Alb  2.8    /  TBili  0.5    /  DBili  x      /  AST  21     /  ALT  49     /  AlkPhos  66     28 Oct 2022 05:50                          11.7   6.10  )-----------( 144      ( 28 Oct 2022 05:50 )             37.0 ,                       11.4   6.33  )-----------( 126      ( 27 Oct 2022 05:55 )             36.3 ,                       11.1   5.77  )-----------( 103      ( 26 Oct 2022 06:40 )             34.3

## 2022-10-28 NOTE — PROGRESS NOTE ADULT - SUBJECTIVE AND OBJECTIVE BOX
INTERVAL HPI/OVERNIGHT EVENTS:        REVIEW OF SYSTEMS:  CONSTITUTIONAL: feels  well presents  no  complaints    NECK: No pain or stiffnes  RESPIRATORY: No SOB   CARDIOVASCULAR: No chest pain, palpitations, dizziness,   GASTROINTESTINAL: No abdominal pain. No nausea, vomiting,   NEUROLOGICAL: No headaches, no  blurry  vision no  dizziness  SKIN: No itching,   MUSCULOSKELETAL: No pain    MEDICATION:  acetaminophen     Tablet .. 650 milliGRAM(s) Oral every 6 hours PRN  apixaban 2.5 milliGRAM(s) Oral two times a day  diltiazem    Tablet 30 milliGRAM(s) Oral three times a day  melatonin 3 milliGRAM(s) Oral at bedtime  metoprolol tartrate 100 milliGRAM(s) Oral every 12 hours    Vital Signs Last 24 Hrs  T(C): 36.7 (28 Oct 2022 04:33), Max: 36.7 (27 Oct 2022 23:49)  T(F): 98.1 (28 Oct 2022 04:33), Max: 98.1 (27 Oct 2022 23:49)  HR: 117 (28 Oct 2022 04:33) (80 - 122)  BP: 130/87 (28 Oct 2022 04:33) (118/80 - 131/87)  BP(mean): --  RR: 18 (28 Oct 2022 04:33) (17 - 18)  SpO2: 95% (28 Oct 2022 04:33) (94% - 95%)    Parameters below as of 28 Oct 2022 04:33  Patient On (Oxygen Delivery Method): room air        PHYSICAL EXAM:  GENERAL: NAD, well-groomed, well-developed  HEENT : Conjuntivae  clear sclerae anicteric  NECK: Supple, No JVD, Normal thyroid  NERVOUS SYSTEM:  Alert oriented   no  focal  deficits;   CHEST/LUNG: Clear    HEART: IRRegular rate and rhythm; No murmurs, rubs, or gallops  ABDOMEN: Soft, Nontender, Nondistended; Bowel sounds present  EXTREMITIES:  no  edema no  tenderness  SKIN: No rashes   LABS:                        11.7   6.10  )-----------( 144      ( 28 Oct 2022 05:50 )             37.0     10-28    142  |  108  |  22  ----------------------------<  92  4.3   |  28  |  0.87    Ca    9.1      28 Oct 2022 05:50    TPro  6.7  /  Alb  2.8<L>  /  TBili  0.5  /  DBili  x   /  AST  21  /  ALT  49  /  AlkPhos  66  10-28    PTT - ( 26 Oct 2022 15:11 )  PTT:39.6 sec    CAPILLARY BLOOD GLUCOSE          RADIOLOGY & ADDITIONAL TESTS:    Imaging reports  Personally Reviewed:  [ ] YES  [ ] NO    Consultant(s) Notes Reviewed:  [ x] YES  [ ] NO    Care Discussed with Consultants/Other Providers [x ] YES  [ ] NO  Problem/Plan - 1:  ·  Problem: Atrial fibrillation, new onset.   continues  with  RVR  an  high  dose  BB  will  increase low dose  CCB  to  4x/day further  adjustments  as per  clinical course  refuses  cardioversion   [id number]  to  monitor  further w/u  and  rx  as per  hannah vuong  Problem/Plan - 2:  ·  Problem: HTN (hypertension).   ·  Plan: metoprolol.    Problem/Plan - 3:  ·  Problem: Insomnia.   ·  Plan: melatonin.  Anemia  monitor  H/H stable  continue  AC

## 2022-10-29 LAB
ALBUMIN SERPL ELPH-MCNC: 2.7 G/DL — LOW (ref 3.3–5)
ALP SERPL-CCNC: 61 U/L — SIGNIFICANT CHANGE UP (ref 40–120)
ALT FLD-CCNC: 45 U/L — SIGNIFICANT CHANGE UP (ref 12–78)
ANION GAP SERPL CALC-SCNC: 6 MMOL/L — SIGNIFICANT CHANGE UP (ref 5–17)
AST SERPL-CCNC: 21 U/L — SIGNIFICANT CHANGE UP (ref 15–37)
BILIRUB SERPL-MCNC: 0.4 MG/DL — SIGNIFICANT CHANGE UP (ref 0.2–1.2)
BUN SERPL-MCNC: 21 MG/DL — SIGNIFICANT CHANGE UP (ref 7–23)
CALCIUM SERPL-MCNC: 8.8 MG/DL — SIGNIFICANT CHANGE UP (ref 8.5–10.1)
CHLORIDE SERPL-SCNC: 108 MMOL/L — SIGNIFICANT CHANGE UP (ref 96–108)
CO2 SERPL-SCNC: 30 MMOL/L — SIGNIFICANT CHANGE UP (ref 22–31)
CREAT SERPL-MCNC: 0.89 MG/DL — SIGNIFICANT CHANGE UP (ref 0.5–1.3)
EGFR: 60 ML/MIN/1.73M2 — SIGNIFICANT CHANGE UP
GLUCOSE SERPL-MCNC: 91 MG/DL — SIGNIFICANT CHANGE UP (ref 70–99)
HCT VFR BLD CALC: 35.5 % — SIGNIFICANT CHANGE UP (ref 34.5–45)
HGB BLD-MCNC: 11.1 G/DL — LOW (ref 11.5–15.5)
MCHC RBC-ENTMCNC: 31.3 G/DL — LOW (ref 32–36)
MCHC RBC-ENTMCNC: 32.9 PG — SIGNIFICANT CHANGE UP (ref 27–34)
MCV RBC AUTO: 105.3 FL — HIGH (ref 80–100)
NRBC # BLD: 0 /100 WBCS — SIGNIFICANT CHANGE UP (ref 0–0)
PLATELET # BLD AUTO: 154 K/UL — SIGNIFICANT CHANGE UP (ref 150–400)
POTASSIUM SERPL-MCNC: 4.3 MMOL/L — SIGNIFICANT CHANGE UP (ref 3.5–5.3)
POTASSIUM SERPL-SCNC: 4.3 MMOL/L — SIGNIFICANT CHANGE UP (ref 3.5–5.3)
PROT SERPL-MCNC: 6.4 GM/DL — SIGNIFICANT CHANGE UP (ref 6–8.3)
RBC # BLD: 3.37 M/UL — LOW (ref 3.8–5.2)
RBC # FLD: 13.9 % — SIGNIFICANT CHANGE UP (ref 10.3–14.5)
SODIUM SERPL-SCNC: 144 MMOL/L — SIGNIFICANT CHANGE UP (ref 135–145)
WBC # BLD: 4.91 K/UL — SIGNIFICANT CHANGE UP (ref 3.8–10.5)
WBC # FLD AUTO: 4.91 K/UL — SIGNIFICANT CHANGE UP (ref 3.8–10.5)

## 2022-10-29 RX ADMIN — Medication 30 MILLIGRAM(S): at 05:16

## 2022-10-29 RX ADMIN — Medication 650 MILLIGRAM(S): at 22:30

## 2022-10-29 RX ADMIN — Medication 30 MILLIGRAM(S): at 11:46

## 2022-10-29 RX ADMIN — Medication 100 MILLIGRAM(S): at 17:33

## 2022-10-29 RX ADMIN — Medication 30 MILLIGRAM(S): at 00:39

## 2022-10-29 RX ADMIN — Medication 30 MILLIGRAM(S): at 17:33

## 2022-10-29 RX ADMIN — Medication 650 MILLIGRAM(S): at 23:30

## 2022-10-29 RX ADMIN — Medication 100 MILLIGRAM(S): at 05:16

## 2022-10-29 NOTE — PROGRESS NOTE ADULT - SUBJECTIVE AND OBJECTIVE BOX
INTERVAL HPI/OVERNIGHT EVENTS:        REVIEW OF SYSTEMS:  CONSTITUTIONAL:    episodic  heart  racing    NECK: No pain or stiffnes  RESPIRATORY: No SOB   CARDIOVASCULAR: No chest pain, palpitations, dizziness,   GASTROINTESTINAL: No abdominal pain. No nausea, vomiting,   NEUROLOGICAL: No headaches, no  blurry  vision no  dizziness  SKIN: No itching,   MUSCULOSKELETAL: No pain    MEDICATION:  acetaminophen     Tablet .. 650 milliGRAM(s) Oral every 6 hours PRN  diltiazem    Tablet 30 milliGRAM(s) Oral every 6 hours  melatonin 3 milliGRAM(s) Oral at bedtime  metoprolol tartrate 100 milliGRAM(s) Oral every 12 hours    Vital Signs Last 24 Hrs  T(C): 36.3 (29 Oct 2022 04:48), Max: 36.6 (28 Oct 2022 11:00)  T(F): 97.3 (29 Oct 2022 04:48), Max: 97.9 (28 Oct 2022 11:00)  HR: 118 (29 Oct 2022 04:48) (73 - 118)  BP: 123/84 (29 Oct 2022 04:48) (103/67 - 124/87)  BP(mean): --  RR: 18 (29 Oct 2022 04:48) (18 - 18)  SpO2: 95% (29 Oct 2022 04:48) (95% - 97%)    Parameters below as of 29 Oct 2022 04:48  Patient On (Oxygen Delivery Method): room air        PHYSICAL EXAM:  GENERAL: NAD, well-groomed, well-developed  HEENT : Conjuntivae  clear sclerae anicteric  NECK: Supple, No JVD, Normal thyroid  NERVOUS SYSTEM:  Alert oriented   no  focal  deficits;   CHEST/LUNG: Clear    HEART: Regular rate and rhythm; No murmurs, rubs, or gallops  ABDOMEN: Soft, Nontender, Nondistended; Bowel sounds present  EXTREMITIES:  no  edema no  tenderness  SKIN: No rashes   LABS:                        11.1   4.91  )-----------( 154      ( 29 Oct 2022 06:05 )             35.5     10-29    144  |  108  |  21  ----------------------------<  91  4.3   |  30  |  0.89    Ca    8.8      29 Oct 2022 06:05    TPro  6.4  /  Alb  2.7<L>  /  TBili  0.4  /  DBili  x   /  AST  21  /  ALT  45  /  AlkPhos  61  10-29        CAPILLARY BLOOD GLUCOSE          RADIOLOGY & ADDITIONAL TESTS:    Imaging reports  Personally Reviewed:  [ ] YES  [ ] NO    Consultant(s) Notes Reviewed:  [x ] YES  [ ] NO    Care Discussed with Consultants/Other Providers [x ] YES  [ ] NO  Problem/Plan - 1:  ·  Problem: Atrial fibrillation, new onset.   continues  with  RVR  an  high  dose  BB  will  increase low dose  CCB  to  4x/day further  adjustments  as per  clinical course  refuses  cardioversion     Problem/Plan - 2:  ·  Problem: HTN (hypertension).   ·  Plan: metoprolol.    Problem/Plan - 3:  ·  Problem: Insomnia.   ·  Plan: melatonin.  Anemia  monitor  H/H stable    off AC

## 2022-10-30 LAB
ALBUMIN SERPL ELPH-MCNC: 2.6 G/DL — LOW (ref 3.3–5)
ALP SERPL-CCNC: 58 U/L — SIGNIFICANT CHANGE UP (ref 40–120)
ALT FLD-CCNC: 37 U/L — SIGNIFICANT CHANGE UP (ref 12–78)
ANION GAP SERPL CALC-SCNC: 7 MMOL/L — SIGNIFICANT CHANGE UP (ref 5–17)
AST SERPL-CCNC: 20 U/L — SIGNIFICANT CHANGE UP (ref 15–37)
BILIRUB SERPL-MCNC: 0.4 MG/DL — SIGNIFICANT CHANGE UP (ref 0.2–1.2)
BUN SERPL-MCNC: 21 MG/DL — SIGNIFICANT CHANGE UP (ref 7–23)
CALCIUM SERPL-MCNC: 8.7 MG/DL — SIGNIFICANT CHANGE UP (ref 8.5–10.1)
CHLORIDE SERPL-SCNC: 109 MMOL/L — HIGH (ref 96–108)
CO2 SERPL-SCNC: 26 MMOL/L — SIGNIFICANT CHANGE UP (ref 22–31)
CREAT SERPL-MCNC: 0.86 MG/DL — SIGNIFICANT CHANGE UP (ref 0.5–1.3)
EGFR: 63 ML/MIN/1.73M2 — SIGNIFICANT CHANGE UP
FLUAV AG NPH QL: SIGNIFICANT CHANGE UP
FLUBV AG NPH QL: SIGNIFICANT CHANGE UP
GLUCOSE SERPL-MCNC: 88 MG/DL — SIGNIFICANT CHANGE UP (ref 70–99)
HCT VFR BLD CALC: 35.8 % — SIGNIFICANT CHANGE UP (ref 34.5–45)
HGB BLD-MCNC: 11.5 G/DL — SIGNIFICANT CHANGE UP (ref 11.5–15.5)
MCHC RBC-ENTMCNC: 32.1 G/DL — SIGNIFICANT CHANGE UP (ref 32–36)
MCHC RBC-ENTMCNC: 33 PG — SIGNIFICANT CHANGE UP (ref 27–34)
MCV RBC AUTO: 102.6 FL — HIGH (ref 80–100)
NRBC # BLD: 0 /100 WBCS — SIGNIFICANT CHANGE UP (ref 0–0)
PLATELET # BLD AUTO: 152 K/UL — SIGNIFICANT CHANGE UP (ref 150–400)
POTASSIUM SERPL-MCNC: 4.6 MMOL/L — SIGNIFICANT CHANGE UP (ref 3.5–5.3)
POTASSIUM SERPL-SCNC: 4.6 MMOL/L — SIGNIFICANT CHANGE UP (ref 3.5–5.3)
PROT SERPL-MCNC: 6.2 GM/DL — SIGNIFICANT CHANGE UP (ref 6–8.3)
RBC # BLD: 3.49 M/UL — LOW (ref 3.8–5.2)
RBC # FLD: 13.8 % — SIGNIFICANT CHANGE UP (ref 10.3–14.5)
SARS-COV-2 RNA SPEC QL NAA+PROBE: SIGNIFICANT CHANGE UP
SODIUM SERPL-SCNC: 142 MMOL/L — SIGNIFICANT CHANGE UP (ref 135–145)
WBC # BLD: 5.71 K/UL — SIGNIFICANT CHANGE UP (ref 3.8–10.5)
WBC # FLD AUTO: 5.71 K/UL — SIGNIFICANT CHANGE UP (ref 3.8–10.5)

## 2022-10-30 RX ORDER — DILTIAZEM HCL 120 MG
60 CAPSULE, EXT RELEASE 24 HR ORAL EVERY 6 HOURS
Refills: 0 | Status: DISCONTINUED | OUTPATIENT
Start: 2022-10-30 | End: 2022-10-31

## 2022-10-30 RX ADMIN — Medication 100 MILLIGRAM(S): at 17:20

## 2022-10-30 RX ADMIN — Medication 60 MILLIGRAM(S): at 11:36

## 2022-10-30 RX ADMIN — Medication 30 MILLIGRAM(S): at 05:59

## 2022-10-30 RX ADMIN — Medication 3 MILLIGRAM(S): at 21:50

## 2022-10-30 RX ADMIN — Medication 100 MILLIGRAM(S): at 05:59

## 2022-10-30 RX ADMIN — Medication 60 MILLIGRAM(S): at 17:20

## 2022-10-30 NOTE — PROGRESS NOTE ADULT - SUBJECTIVE AND OBJECTIVE BOX
INTERVAL HPI/OVERNIGHT EVENTS:    continues  with  rapid  afib in 110s    REVIEW OF SYSTEMS:  CONSTITUTIONAL:  no  complaints    NECK: No pain or stiffnes  RESPIRATORY: No SOB   CARDIOVASCULAR: No chest pain, palpitations, dizziness,   GASTROINTESTINAL: No abdominal pain. No nausea, vomiting,   NEUROLOGICAL: No headaches, no  blurry  vision no  dizziness  SKIN: No itching,   MUSCULOSKELETAL: No pain    MEDICATION:  acetaminophen     Tablet .. 650 milliGRAM(s) Oral every 6 hours PRN  diltiazem    Tablet 60 milliGRAM(s) Oral every 6 hours  melatonin 3 milliGRAM(s) Oral at bedtime  metoprolol tartrate 100 milliGRAM(s) Oral every 12 hours    Vital Signs Last 24 Hrs  T(C): 36.7 (30 Oct 2022 04:56), Max: 36.8 (30 Oct 2022 00:25)  T(F): 98.1 (30 Oct 2022 04:56), Max: 98.2 (30 Oct 2022 00:25)  HR: 108 (30 Oct 2022 04:56) (105 - 108)  BP: 132/88 (30 Oct 2022 04:56) (118/74 - 132/88)  BP(mean): --  RR: 18 (30 Oct 2022 04:56) (17 - 18)  SpO2: 98% (30 Oct 2022 04:56) (96% - 98%)    Parameters below as of 30 Oct 2022 04:56  Patient On (Oxygen Delivery Method): room air        PHYSICAL EXAM:  GENERAL: NAD, well-groomed, well-developed  HEENT : Conjuntivae  clear sclerae anicteric  NECK: Supple, No JVD, Normal thyroid  NERVOUS SYSTEM:  Alert oriented   no  focal  deficits;   CHEST/LUNG: Clear    HEART: IRRegular rate and rhythm; No murmurs, rubs, or gallops  ABDOMEN: Soft, Nontender, Nondistended; Bowel sounds present  EXTREMITIES:  no  edema no  tenderness  SKIN: No rashes   LABS:                        11.5   5.71  )-----------( 152      ( 30 Oct 2022 07:15 )             35.8     10-30    142  |  109<H>  |  21  ----------------------------<  88  4.6   |  26  |  0.86    Ca    8.7      30 Oct 2022 07:15    TPro  6.2  /  Alb  2.6<L>  /  TBili  0.4  /  DBili  x   /  AST  20  /  ALT  37  /  AlkPhos  58  10-30        CAPILLARY BLOOD GLUCOSE          RADIOLOGY & ADDITIONAL TESTS:    Imaging reports  Personally Reviewed:  [ ] YES  [ ] NO    Consultant(s) Notes Reviewed:  [ ] YES  [ ] NO    Care Discussed with Consultants/Other Providers [x ] YES  [ ] NO  Problem/Plan - 1:  ·  Problem: Atrial fibrillation, new onset.   continues  with  RVR  an  high  dose  BB  will  increase  dose  Cardizem  to  60 mg to  4x/day  continue  observation further  adjustments  as per  clinical course  refuses  cardioversion     Problem/Plan - 2:  ·  Problem: HTN (hypertension).   ·  Plan: metoprolol. cardizem    Problem/Plan - 3:  ·  Problem: Insomnia.   ·  Plan: melatonin.  Anemia  monitor  H/H stable    off AC

## 2022-10-31 ENCOUNTER — TRANSCRIPTION ENCOUNTER (OUTPATIENT)
Age: 87
End: 2022-10-31

## 2022-10-31 VITALS
SYSTOLIC BLOOD PRESSURE: 138 MMHG | RESPIRATION RATE: 19 BRPM | DIASTOLIC BLOOD PRESSURE: 81 MMHG | HEART RATE: 97 BPM | OXYGEN SATURATION: 93 %

## 2022-10-31 LAB
ANION GAP SERPL CALC-SCNC: 5 MMOL/L — SIGNIFICANT CHANGE UP (ref 5–17)
BUN SERPL-MCNC: 20 MG/DL — SIGNIFICANT CHANGE UP (ref 7–23)
CALCIUM SERPL-MCNC: 9 MG/DL — SIGNIFICANT CHANGE UP (ref 8.5–10.1)
CHLORIDE SERPL-SCNC: 108 MMOL/L — SIGNIFICANT CHANGE UP (ref 96–108)
CO2 SERPL-SCNC: 29 MMOL/L — SIGNIFICANT CHANGE UP (ref 22–31)
CREAT SERPL-MCNC: 0.95 MG/DL — SIGNIFICANT CHANGE UP (ref 0.5–1.3)
EGFR: 56 ML/MIN/1.73M2 — LOW
GLUCOSE SERPL-MCNC: 97 MG/DL — SIGNIFICANT CHANGE UP (ref 70–99)
HCT VFR BLD CALC: 36.9 % — SIGNIFICANT CHANGE UP (ref 34.5–45)
HGB BLD-MCNC: 11.8 G/DL — SIGNIFICANT CHANGE UP (ref 11.5–15.5)
MCHC RBC-ENTMCNC: 32 G/DL — SIGNIFICANT CHANGE UP (ref 32–36)
MCHC RBC-ENTMCNC: 33 PG — SIGNIFICANT CHANGE UP (ref 27–34)
MCV RBC AUTO: 103.1 FL — HIGH (ref 80–100)
NRBC # BLD: 0 /100 WBCS — SIGNIFICANT CHANGE UP (ref 0–0)
PLATELET # BLD AUTO: 168 K/UL — SIGNIFICANT CHANGE UP (ref 150–400)
POTASSIUM SERPL-MCNC: 4.7 MMOL/L — SIGNIFICANT CHANGE UP (ref 3.5–5.3)
POTASSIUM SERPL-SCNC: 4.7 MMOL/L — SIGNIFICANT CHANGE UP (ref 3.5–5.3)
RBC # BLD: 3.58 M/UL — LOW (ref 3.8–5.2)
RBC # FLD: 13.8 % — SIGNIFICANT CHANGE UP (ref 10.3–14.5)
SODIUM SERPL-SCNC: 142 MMOL/L — SIGNIFICANT CHANGE UP (ref 135–145)
WBC # BLD: 6.38 K/UL — SIGNIFICANT CHANGE UP (ref 3.8–10.5)
WBC # FLD AUTO: 6.38 K/UL — SIGNIFICANT CHANGE UP (ref 3.8–10.5)

## 2022-10-31 PROCEDURE — 93306 TTE W/DOPPLER COMPLETE: CPT | Mod: 26

## 2022-10-31 RX ORDER — METOPROLOL TARTRATE 50 MG
1 TABLET ORAL
Qty: 60 | Refills: 0
Start: 2022-10-31 | End: 2022-11-29

## 2022-10-31 RX ORDER — DILTIAZEM HCL 120 MG
1 CAPSULE, EXT RELEASE 24 HR ORAL
Qty: 0 | Refills: 0 | DISCHARGE
Start: 2022-10-31

## 2022-10-31 RX ORDER — ASPIRIN/CALCIUM CARB/MAGNESIUM 324 MG
1 TABLET ORAL
Qty: 0 | Refills: 0 | DISCHARGE
Start: 2022-10-31

## 2022-10-31 RX ORDER — ASPIRIN/CALCIUM CARB/MAGNESIUM 324 MG
81 TABLET ORAL DAILY
Refills: 0 | Status: DISCONTINUED | OUTPATIENT
Start: 2022-10-31 | End: 2022-10-31

## 2022-10-31 RX ORDER — ACETAMINOPHEN 500 MG
2 TABLET ORAL
Qty: 0 | Refills: 0 | DISCHARGE
Start: 2022-10-31

## 2022-10-31 RX ADMIN — Medication 60 MILLIGRAM(S): at 11:33

## 2022-10-31 RX ADMIN — Medication 60 MILLIGRAM(S): at 05:35

## 2022-10-31 RX ADMIN — Medication 100 MILLIGRAM(S): at 17:48

## 2022-10-31 RX ADMIN — Medication 60 MILLIGRAM(S): at 17:48

## 2022-10-31 RX ADMIN — Medication 100 MILLIGRAM(S): at 05:35

## 2022-10-31 RX ADMIN — Medication 60 MILLIGRAM(S): at 00:05

## 2022-10-31 NOTE — DIETITIAN NUTRITION RISK NOTIFICATION - TREATMENT: THE FOLLOWING DIET HAS BEEN RECOMMENDED
Diet, Regular:   Low Sodium  Supplement Feeding Modality:  Oral  Ensure Plus High Protein Cans or Servings Per Day:  1       Frequency:  Two Times a day (10-31-22 @ 14:57) [Pending Verification By Attending]  Diet, DASH/TLC:   Sodium & Cholesterol Restricted (10-23-22 @ 16:00) [Active]

## 2022-10-31 NOTE — DISCHARGE NOTE PROVIDER - NSDCMRMEDTOKEN_GEN_ALL_CORE_FT
acetaminophen 325 mg oral tablet: 2 tab(s) orally every 6 hours, As needed, Mild Pain (1 - 3)  dilTIAZem 60 mg oral tablet: 1 tab(s) orally every 6 hours  melatonin 3 mg oral tablet: 1 tab(s) orally once a day (at bedtime)   acetaminophen 325 mg oral tablet: 2 tab(s) orally every 6 hours, As needed, Mild Pain (1 - 3)  aspirin 81 mg oral delayed release tablet: 1 tab(s) orally once a day  dilTIAZem 60 mg oral tablet: 1 tab(s) orally every 6 hours  melatonin 3 mg oral tablet: 1 tab(s) orally once a day (at bedtime)

## 2022-10-31 NOTE — PROGRESS NOTE ADULT - SUBJECTIVE AND OBJECTIVE BOX
INTERVAL HPI/OVERNIGHT EVENTS:        REVIEW OF SYSTEMS:  CONSTITUTIONAL: feels well  no  complaints    NECK: No pain or stiffnes  RESPIRATORY: No SOB   CARDIOVASCULAR: No chest pain, palpitations, dizziness,   GASTROINTESTINAL: No abdominal pain. No nausea, vomiting,   NEUROLOGICAL: No headaches, no  blurry  vision no  dizziness  SKIN: No itching,   MUSCULOSKELETAL: No pain    MEDICATION:  acetaminophen     Tablet .. 650 milliGRAM(s) Oral every 6 hours PRN  diltiazem    Tablet 60 milliGRAM(s) Oral every 6 hours  melatonin 3 milliGRAM(s) Oral at bedtime  metoprolol tartrate 100 milliGRAM(s) Oral every 12 hours    Vital Signs Last 24 Hrs  T(C): 36.4 (31 Oct 2022 05:05), Max: 36.8 (30 Oct 2022 10:19)  T(F): 97.6 (31 Oct 2022 05:05), Max: 98.3 (30 Oct 2022 10:19)  HR: 100 (31 Oct 2022 05:05) (86 - 107)  BP: 125/79 (31 Oct 2022 05:05) (108/71 - 127/80)  BP(mean): --  RR: 18 (31 Oct 2022 05:05) (17 - 18)  SpO2: 97% (31 Oct 2022 05:05) (95% - 98%)    Parameters below as of 31 Oct 2022 05:05  Patient On (Oxygen Delivery Method): room air        PHYSICAL EXAM:  GENERAL: NAD, well-groomed, well-developed  HEENT : Conjuntivae  clear sclerae anicteric  NECK: Supple, No JVD, Normal thyroid  NERVOUS SYSTEM:  Alert oriented   no  focal  deficits;   CHEST/LUNG: Clear    HEART: IRRegular rate and rhythm; No murmurs, rubs, or gallops  ABDOMEN: Soft, Nontender, Nondistended; Bowel sounds present  EXTREMITIES:  no  edema no  tenderness  SKIN: No rashes   LABS:                        11.8   6.38  )-----------( 168      ( 31 Oct 2022 06:46 )             36.9     10-31    142  |  108  |  20  ----------------------------<  97  4.7   |  29  |  0.95    Ca    9.0      31 Oct 2022 06:46    TPro  6.2  /  Alb  2.6<L>  /  TBili  0.4  /  DBili  x   /  AST  20  /  ALT  37  /  AlkPhos  58  10-30        CAPILLARY BLOOD GLUCOSE          RADIOLOGY & ADDITIONAL TESTS:    Imaging reports  Personally Reviewed:  [ ] YES  [ ] NO    Consultant(s) Notes Reviewed:  [x ] YES  [ ] NO    Care Discussed with Consultants/Other Providers [x ] YES  [ ] NO  Problem/Plan - 1:  ·  Problem: Atrial fibrillation, new onset.   continues  with  RVR  an  high  dose  BB  will  increase  dose  Cardizem  to  60 mg to  4x/day  continue  observation further  adjustments  as per  clinical course  refuses  cardioversion     Problem/Plan - 2:  ·  Problem: HTN (hypertension).   ·  Plan: metoprolol. cardizem    Problem/Plan - 3:  ·  Problem: Insomnia.   ·  Plan: melatonin.  Anemia  monitor  H/H stable    off AC     discharge  to  rehab

## 2022-10-31 NOTE — PROGRESS NOTE ADULT - REASON FOR ADMISSION
dizzines new  onst  afib  with  rvr
dizzines new  onst  afib  with  rvr
dizziness new  onset  afib  with  rvr
dizziness new  onset  afib  with  rvr
dizzines new  onst  afib  with  rvr
dizzines new  onst  afib  with  rvr
dizziness new  onset  afib  with  rvr
dizzines new  onst  afib  with  rvr

## 2022-10-31 NOTE — DISCHARGE NOTE NURSING/CASE MANAGEMENT/SOCIAL WORK - PATIENT PORTAL LINK FT
You can access the FollowMyHealth Patient Portal offered by [facility] by registering at the following website: [email or web address]. By joining [employer]’s FollowMyHealth portal, you will also be able to view your health information using other applications (apps) compatible with our system.

## 2022-10-31 NOTE — DISCHARGE NOTE PROVIDER - CARE PROVIDER_API CALL
Octaviano Adam)  Physician  NPs  [address] [place] [address]  Phone: [phone]  Fax: [phone]  Follow Up Time:     Heriberto Avian)  Cardiology; Interventional Cardiology  [address], [place] [id number]  Phone: [phone]  Fax: [phone]  Follow Up Time:

## 2022-10-31 NOTE — DIETITIAN INITIAL EVALUATION ADULT - PERTINENT LABORATORY DATA
10-31    142  |  108  |  20  ----------------------------<  97  4.7   |  29  |  0.95    Ca    9.0      31 Oct 2022 06:46    TPro  6.2  /  Alb  2.6<L>  /  TBili  0.4  /  DBili  x   /  AST  20  /  ALT  37  /  AlkPhos  58  10-30

## 2022-10-31 NOTE — DISCHARGE NOTE PROVIDER - HOSPITAL COURSE
89 yo female pmh of htn admitted with weakness and noted with afib rvr pt placed on telemetry and and her home atenolol was changed to lopressor 100 bid with poor rate control, cariology consulted and Cardizem 60 each 6 hours started . tte revealed ef 65% ,moderate pulmonary regurgitation, moderate tr, pt was offered dc cardioversion but could not make decision at this time ,pt was seen and evaluated by rehab team and plan for sub acute rehab . 89 yo female pmh of htn admitted with weakness and noted with afib rvr pt placed on telemetry and and her home atenolol was changed to lopressor 100 bid with poor rate control, cariology consulted and Cardizem 60 each 6 hours started . tte revealed ef 65% ,moderate pulmonary regurgitation, moderate tr, pt was offered dc cardioversion but could not make decision at this time ,team meeting and pt was high risk for anticoagulation as she has fall risk placed on asa 81 day.pt was seen and evaluated by rehab team and plan for sub acute rehab .

## 2022-10-31 NOTE — DISCHARGE NOTE PROVIDER - CARE PROVIDERS DIRECT ADDRESSES
[email or web address]

## 2022-10-31 NOTE — DISCHARGE NOTE PROVIDER - DETAILS OF MALNUTRITION DIAGNOSIS/DIAGNOSES
This patient has been assessed with a concern for Malnutrition and was treated during this hospitalization for the following Nutrition diagnosis/diagnoses:     -  10/31/2022: Moderate protein-calorie malnutrition

## 2022-10-31 NOTE — DISCHARGE NOTE NURSING/CASE MANAGEMENT/SOCIAL WORK - NSDCPEFALRISK_GEN_ALL_CORE
For information on Fall & Injury Prevention, visit: [email or web address] OR  [email or web address] OR  https://www.cdc.gov/steadi/patient.html

## 2022-10-31 NOTE — DISCHARGE NOTE PROVIDER - NSDCCPCAREPLAN_GEN_ALL_CORE_FT
PRINCIPAL DISCHARGE DIAGNOSIS  Diagnosis: Atrial fibrillation  Assessment and Plan of Treatment: continue cardizem and metropolol 100 q 12  follow up with rehab team for continued c      SECONDARY DISCHARGE DIAGNOSES  Diagnosis: NSTEMI (non-ST elevation myocardial infarction)  Assessment and Plan of Treatment: troponin leak no intervention needed as per cardiology

## 2022-10-31 NOTE — PROGRESS NOTE ADULT - PROVIDER SPECIALTY LIST ADULT
Internal Medicine
Cardiology
Internal Medicine
Cardiology

## 2022-10-31 NOTE — DIETITIAN INITIAL EVALUATION ADULT - OTHER INFO
Pt lives alone; independent c ADLs PTA; has supportive nephew & niece nearby. Pt is aware that when she returns home she will most likely need assistance.  Pt reports that she eats 2 meals/day; lunch & dinner; has good appetite; wt has been stable as far as she is aware.  Denies any N/V/C/D or chew/swallowing difficulty; refused offer for altered consistency for ease of eating; reports she is able to cut-up her food.

## 2022-11-03 PROBLEM — Z00.00 ENCOUNTER FOR PREVENTIVE HEALTH EXAMINATION: Status: ACTIVE | Noted: 2022-11-03

## 2022-11-23 NOTE — PROGRESS NOTE ADULT - SUBJECTIVE AND OBJECTIVE BOX
Patient is a 89y old  Female who presents with metabolic enceph due to medication changes        SUBJECTIVE / OVERNIGHT EVENTS: wants to know when she's going to rehab because she's losing too much weight      MEDICATIONS  (STANDING):  aspirin enteric coated 81 milliGRAM(s) Oral daily  ATENolol  Tablet 50 milliGRAM(s) Oral daily  enoxaparin Injectable 30 milliGRAM(s) SubCutaneous daily  melatonin 3 milliGRAM(s) Oral <User Schedule>    MEDICATIONS  (PRN):      Vital Signs Last 24 Hrs  T(F): 98.6 (18 Dec 2017 12:10), Max: 99 (17 Dec 2017 17:12)  HR: 73 (18 Dec 2017 12:10) (65 - 79)  BP: 134/74 (18 Dec 2017 12:10) (111/55 - 148/68)  RR: 18 (18 Dec 2017 12:10) (17 - 18)  SpO2: 95% (18 Dec 2017 12:10) (95% - 99%)          PHYSICAL EXAM  GENERAL: NAD, well-developed  HEAD:  Atraumatic, Normocephalic  EYES: EOMI, PERRLA, conjunctiva and sclera clear  CHEST/LUNG: b/l AE  HEART: Regular rate and rhythm; No murmurs, rubs, or gallops  ABDOMEN: Soft, Nontender, Nondistended; Bowel sounds present  EXTREMITIES:   No clubbing, cyanosis, or edema  PSYCH: calm, coop      LABS:                        12.6   8.1   )-----------( 159      ( 17 Dec 2017 07:41 )             38.7     12-17    140  |  104  |  23  ----------------------------<  103<H>  4.2   |  28  |  0.90    Ca    8.8      17 Dec 2017 07:41 0

## 2022-12-07 NOTE — DIETITIAN INITIAL EVALUATION ADULT. - WEIGHT CHANGE
yes
Patient's first and last name, , procedure, and correct site confirmed prior to the start of procedure.

## 2023-05-19 NOTE — ED ADULT TRIAGE NOTE - BP NONINVASIVE SYSTOLIC (MM HG)
148 [Nl] : Integumentary [Redness] : redness [Nasal Stuffiness] : nasal congestion [Cough] : cough [Eye Discharge] : no eye discharge [Sore Throat] : no sore throat [Earache] : no earache [Nosebleeds] : no epistaxis

## 2024-01-01 ENCOUNTER — INPATIENT (INPATIENT)
Facility: HOSPITAL | Age: 89
LOS: 0 days | End: 2024-03-02
Attending: INTERNAL MEDICINE | Admitting: INTERNAL MEDICINE
Payer: MEDICARE

## 2024-01-01 VITALS
SYSTOLIC BLOOD PRESSURE: 48 MMHG | TEMPERATURE: 98 F | RESPIRATION RATE: 8 BRPM | OXYGEN SATURATION: 92 % | DIASTOLIC BLOOD PRESSURE: 19 MMHG

## 2024-01-01 VITALS
HEART RATE: 54 BPM | WEIGHT: 165.35 LBS | SYSTOLIC BLOOD PRESSURE: 160 MMHG | HEIGHT: 64 IN | OXYGEN SATURATION: 93 % | RESPIRATION RATE: 22 BRPM | DIASTOLIC BLOOD PRESSURE: 116 MMHG

## 2024-01-01 LAB
ALBUMIN SERPL ELPH-MCNC: 1.9 G/DL — LOW (ref 3.3–5)
ALBUMIN SERPL ELPH-MCNC: 2.5 G/DL — LOW (ref 3.3–5)
ALP SERPL-CCNC: 138 U/L — HIGH (ref 40–120)
ALP SERPL-CCNC: 150 U/L — HIGH (ref 40–120)
ALT FLD-CCNC: 148 U/L — HIGH (ref 12–78)
ALT FLD-CCNC: 324 U/L — HIGH (ref 12–78)
ANION GAP SERPL CALC-SCNC: 15 MMOL/L — SIGNIFICANT CHANGE UP (ref 5–17)
ANION GAP SERPL CALC-SCNC: 18 MMOL/L — HIGH (ref 5–17)
ANISOCYTOSIS BLD QL: SLIGHT — SIGNIFICANT CHANGE UP
APPEARANCE UR: ABNORMAL
APTT BLD: 41.7 SEC — HIGH (ref 24.5–35.6)
AST SERPL-CCNC: 236 U/L — HIGH (ref 15–37)
AST SERPL-CCNC: 767 U/L — HIGH (ref 15–37)
BACTERIA # UR AUTO: ABNORMAL /HPF
BASE EXCESS BLDA CALC-SCNC: -10 MMOL/L — LOW (ref -2–3)
BASE EXCESS BLDA CALC-SCNC: -13.6 MMOL/L — LOW (ref -2–3)
BASOPHILS # BLD AUTO: 0 K/UL — SIGNIFICANT CHANGE UP (ref 0–0.2)
BASOPHILS NFR BLD AUTO: 0 % — SIGNIFICANT CHANGE UP (ref 0–2)
BILIRUB SERPL-MCNC: 1.3 MG/DL — HIGH (ref 0.2–1.2)
BILIRUB SERPL-MCNC: 1.5 MG/DL — HIGH (ref 0.2–1.2)
BILIRUB UR-MCNC: ABNORMAL
BLOOD GAS COMMENTS ARTERIAL: SIGNIFICANT CHANGE UP
BLOOD GAS COMMENTS ARTERIAL: SIGNIFICANT CHANGE UP
BUN SERPL-MCNC: 19 MG/DL — SIGNIFICANT CHANGE UP (ref 7–23)
BUN SERPL-MCNC: 24 MG/DL — HIGH (ref 7–23)
BURR CELLS BLD QL SMEAR: PRESENT — SIGNIFICANT CHANGE UP
CALCIUM SERPL-MCNC: 7.4 MG/DL — LOW (ref 8.5–10.1)
CALCIUM SERPL-MCNC: 7.8 MG/DL — LOW (ref 8.5–10.1)
CHLORIDE SERPL-SCNC: 109 MMOL/L — HIGH (ref 96–108)
CHLORIDE SERPL-SCNC: 113 MMOL/L — HIGH (ref 96–108)
CO2 BLDA-SCNC: 14 MMOL/L — LOW (ref 19–24)
CO2 BLDA-SCNC: 17 MMOL/L — LOW (ref 19–24)
CO2 SERPL-SCNC: 11 MMOL/L — LOW (ref 22–31)
CO2 SERPL-SCNC: 16 MMOL/L — LOW (ref 22–31)
COLOR SPEC: SIGNIFICANT CHANGE UP
CREAT SERPL-MCNC: 1.49 MG/DL — HIGH (ref 0.5–1.3)
CREAT SERPL-MCNC: 1.51 MG/DL — HIGH (ref 0.5–1.3)
DIFF PNL FLD: ABNORMAL
EGFR: 31 ML/MIN/1.73M2 — LOW
EGFR: 32 ML/MIN/1.73M2 — LOW
EOSINOPHIL # BLD AUTO: 0 K/UL — SIGNIFICANT CHANGE UP (ref 0–0.5)
EOSINOPHIL NFR BLD AUTO: 0 % — SIGNIFICANT CHANGE UP (ref 0–6)
EPI CELLS # UR: PRESENT
GAS PNL BLDA: SIGNIFICANT CHANGE UP
GLUCOSE SERPL-MCNC: 186 MG/DL — HIGH (ref 70–99)
GLUCOSE SERPL-MCNC: 25 MG/DL — CRITICAL LOW (ref 70–99)
GLUCOSE UR QL: 100 MG/DL
HCO3 BLDA-SCNC: 13 MMOL/L — LOW (ref 21–28)
HCO3 BLDA-SCNC: 16 MMOL/L — LOW (ref 21–28)
HCT VFR BLD CALC: 29.1 % — LOW (ref 34.5–45)
HCT VFR BLD CALC: 34.7 % — SIGNIFICANT CHANGE UP (ref 34.5–45)
HGB BLD-MCNC: 10.9 G/DL — LOW (ref 11.5–15.5)
HGB BLD-MCNC: 9.4 G/DL — LOW (ref 11.5–15.5)
HOROWITZ INDEX BLDA+IHG-RTO: 100 — SIGNIFICANT CHANGE UP
HOROWITZ INDEX BLDA+IHG-RTO: 100 — SIGNIFICANT CHANGE UP
INR BLD: 3.17 RATIO — HIGH (ref 0.85–1.18)
KETONES UR-MCNC: ABNORMAL MG/DL
LACTATE SERPL-SCNC: 12.1 MMOL/L — CRITICAL HIGH (ref 0.7–2)
LEUKOCYTE ESTERASE UR-ACNC: ABNORMAL
LYMPHOCYTES # BLD AUTO: 4.36 K/UL — HIGH (ref 1–3.3)
LYMPHOCYTES # BLD AUTO: 42 % — SIGNIFICANT CHANGE UP (ref 13–44)
MACROCYTES BLD QL: SIGNIFICANT CHANGE UP
MAGNESIUM SERPL-MCNC: 1.9 MG/DL — SIGNIFICANT CHANGE UP (ref 1.6–2.6)
MAGNESIUM SERPL-MCNC: 2 MG/DL — SIGNIFICANT CHANGE UP (ref 1.6–2.6)
MANUAL SMEAR VERIFICATION: SIGNIFICANT CHANGE UP
MCHC RBC-ENTMCNC: 31.4 G/DL — LOW (ref 32–36)
MCHC RBC-ENTMCNC: 32.3 G/DL — SIGNIFICANT CHANGE UP (ref 32–36)
MCHC RBC-ENTMCNC: 33.9 PG — SIGNIFICANT CHANGE UP (ref 27–34)
MCHC RBC-ENTMCNC: 34.2 PG — HIGH (ref 27–34)
MCV RBC AUTO: 105.8 FL — HIGH (ref 80–100)
MCV RBC AUTO: 107.8 FL — HIGH (ref 80–100)
METAMYELOCYTES # FLD: 2 % — HIGH (ref 0–0)
MONOCYTES # BLD AUTO: 0.62 K/UL — SIGNIFICANT CHANGE UP (ref 0–0.9)
MONOCYTES NFR BLD AUTO: 6 % — SIGNIFICANT CHANGE UP (ref 2–14)
NEUTROPHILS # BLD AUTO: 5.19 K/UL — SIGNIFICANT CHANGE UP (ref 1.8–7.4)
NEUTROPHILS NFR BLD AUTO: 47 % — SIGNIFICANT CHANGE UP (ref 43–77)
NEUTS BAND # BLD: 3 % — SIGNIFICANT CHANGE UP (ref 0–8)
NITRITE UR-MCNC: NEGATIVE — SIGNIFICANT CHANGE UP
NRBC # BLD: 0 /100 WBCS — SIGNIFICANT CHANGE UP (ref 0–0)
NRBC # BLD: 0 /100 WBCS — SIGNIFICANT CHANGE UP (ref 0–0)
NRBC # BLD: SIGNIFICANT CHANGE UP /100 WBCS (ref 0–0)
NT-PROBNP SERPL-SCNC: HIGH PG/ML (ref 0–450)
OVALOCYTES BLD QL SMEAR: SLIGHT — SIGNIFICANT CHANGE UP
PCO2 BLDA: 33 MMHG — SIGNIFICANT CHANGE UP (ref 32–46)
PCO2 BLDA: 36 MMHG — SIGNIFICANT CHANGE UP (ref 32–46)
PH BLDA: 7.21 — LOW (ref 7.35–7.45)
PH BLDA: 7.26 — LOW (ref 7.35–7.45)
PH UR: 6.5 — SIGNIFICANT CHANGE UP (ref 5–8)
PHOSPHATE SERPL-MCNC: 5.9 MG/DL — HIGH (ref 2.5–4.5)
PHOSPHATE SERPL-MCNC: 6 MG/DL — HIGH (ref 2.5–4.5)
PLAT MORPH BLD: NORMAL — SIGNIFICANT CHANGE UP
PLATELET # BLD AUTO: 75 K/UL — LOW (ref 150–400)
PLATELET # BLD AUTO: 87 K/UL — LOW (ref 150–400)
PO2 BLDA: 71 MMHG — LOW (ref 83–108)
PO2 BLDA: 84 MMHG — SIGNIFICANT CHANGE UP (ref 83–108)
POIKILOCYTOSIS BLD QL AUTO: SLIGHT — SIGNIFICANT CHANGE UP
POTASSIUM SERPL-MCNC: 4.4 MMOL/L — SIGNIFICANT CHANGE UP (ref 3.5–5.3)
POTASSIUM SERPL-MCNC: 5.4 MMOL/L — HIGH (ref 3.5–5.3)
POTASSIUM SERPL-SCNC: 4.4 MMOL/L — SIGNIFICANT CHANGE UP (ref 3.5–5.3)
POTASSIUM SERPL-SCNC: 5.4 MMOL/L — HIGH (ref 3.5–5.3)
PROT SERPL-MCNC: 4 GM/DL — LOW (ref 6–8.3)
PROT SERPL-MCNC: 5.4 GM/DL — LOW (ref 6–8.3)
PROT UR-MCNC: >=1000 MG/DL
PROTHROM AB SERPL-ACNC: 36.5 SEC — HIGH (ref 9.5–13)
RBC # BLD: 2.75 M/UL — LOW (ref 3.8–5.2)
RBC # BLD: 3.22 M/UL — LOW (ref 3.8–5.2)
RBC # FLD: 14.2 % — SIGNIFICANT CHANGE UP (ref 10.3–14.5)
RBC # FLD: 14.6 % — HIGH (ref 10.3–14.5)
RBC BLD AUTO: ABNORMAL
RBC CASTS # UR COMP ASSIST: SIGNIFICANT CHANGE UP /HPF (ref 0–4)
SAO2 % BLDA: 94.2 % — SIGNIFICANT CHANGE UP (ref 94–98)
SAO2 % BLDA: 96.2 % — SIGNIFICANT CHANGE UP (ref 94–98)
SODIUM SERPL-SCNC: 140 MMOL/L — SIGNIFICANT CHANGE UP (ref 135–145)
SODIUM SERPL-SCNC: 142 MMOL/L — SIGNIFICANT CHANGE UP (ref 135–145)
SP GR SPEC: 1.03 — SIGNIFICANT CHANGE UP (ref 1–1.03)
TROPONIN I, HIGH SENSITIVITY RESULT: 210.5 NG/L — HIGH
TROPONIN I, HIGH SENSITIVITY RESULT: 77.5 NG/L — HIGH
UROBILINOGEN FLD QL: 1 MG/DL — SIGNIFICANT CHANGE UP (ref 0.2–1)
WBC # BLD: 10.37 K/UL — SIGNIFICANT CHANGE UP (ref 3.8–10.5)
WBC # BLD: 12.84 K/UL — HIGH (ref 3.8–10.5)
WBC # FLD AUTO: 10.37 K/UL — SIGNIFICANT CHANGE UP (ref 3.8–10.5)
WBC # FLD AUTO: 12.84 K/UL — HIGH (ref 3.8–10.5)
WBC UR QL: SIGNIFICANT CHANGE UP /HPF (ref 0–5)

## 2024-01-01 PROCEDURE — 72125 CT NECK SPINE W/O DYE: CPT | Mod: 26

## 2024-01-01 PROCEDURE — 70450 CT HEAD/BRAIN W/O DYE: CPT | Mod: 26

## 2024-01-01 PROCEDURE — 99291 CRITICAL CARE FIRST HOUR: CPT

## 2024-01-01 PROCEDURE — 71275 CT ANGIOGRAPHY CHEST: CPT | Mod: 26

## 2024-01-01 PROCEDURE — 93010 ELECTROCARDIOGRAM REPORT: CPT

## 2024-01-01 PROCEDURE — 74177 CT ABD & PELVIS W/CONTRAST: CPT | Mod: 26

## 2024-01-01 PROCEDURE — 71045 X-RAY EXAM CHEST 1 VIEW: CPT | Mod: 26

## 2024-01-01 RX ORDER — SODIUM BICARBONATE 1 MEQ/ML
50 SYRINGE (ML) INTRAVENOUS ONCE
Refills: 0 | Status: DISCONTINUED | OUTPATIENT
Start: 2024-01-01 | End: 2024-01-01

## 2024-01-01 RX ORDER — ATROPINE SULFATE 0.1 MG/ML
1 SYRINGE (ML) INJECTION ONCE
Refills: 0 | Status: COMPLETED | OUTPATIENT
Start: 2024-01-01 | End: 2024-01-01

## 2024-01-01 RX ORDER — NOREPINEPHRINE BITARTRATE/D5W 8 MG/250ML
0.05 PLASTIC BAG, INJECTION (ML) INTRAVENOUS
Qty: 8 | Refills: 0 | Status: DISCONTINUED | OUTPATIENT
Start: 2024-01-01 | End: 2024-01-01

## 2024-01-01 RX ORDER — SODIUM CHLORIDE 9 MG/ML
1000 INJECTION INTRAMUSCULAR; INTRAVENOUS; SUBCUTANEOUS ONCE
Refills: 0 | Status: COMPLETED | OUTPATIENT
Start: 2024-01-01 | End: 2024-01-01

## 2024-01-01 RX ORDER — CEFTRIAXONE 500 MG/1
1000 INJECTION, POWDER, FOR SOLUTION INTRAMUSCULAR; INTRAVENOUS ONCE
Refills: 0 | Status: DISCONTINUED | OUTPATIENT
Start: 2024-01-01 | End: 2024-01-01

## 2024-01-01 RX ORDER — CEFTRIAXONE 500 MG/1
INJECTION, POWDER, FOR SOLUTION INTRAMUSCULAR; INTRAVENOUS
Refills: 0 | Status: DISCONTINUED | OUTPATIENT
Start: 2024-01-01 | End: 2024-01-01

## 2024-01-01 RX ORDER — AZITHROMYCIN 500 MG/1
TABLET, FILM COATED ORAL
Refills: 0 | Status: DISCONTINUED | OUTPATIENT
Start: 2024-01-01 | End: 2024-01-01

## 2024-01-01 RX ORDER — HEPARIN SODIUM 5000 [USP'U]/ML
5000 INJECTION INTRAVENOUS; SUBCUTANEOUS EVERY 12 HOURS
Refills: 0 | Status: DISCONTINUED | OUTPATIENT
Start: 2024-01-01 | End: 2024-01-01

## 2024-01-01 RX ORDER — CHLORHEXIDINE GLUCONATE 213 G/1000ML
15 SOLUTION TOPICAL EVERY 12 HOURS
Refills: 0 | Status: DISCONTINUED | OUTPATIENT
Start: 2024-01-01 | End: 2024-01-01

## 2024-01-01 RX ORDER — FENTANYL CITRATE 50 UG/ML
25 INJECTION INTRAVENOUS ONCE
Refills: 0 | Status: COMPLETED | OUTPATIENT
Start: 2024-01-01 | End: 2024-01-01

## 2024-01-01 RX ORDER — PANTOPRAZOLE SODIUM 20 MG/1
40 TABLET, DELAYED RELEASE ORAL DAILY
Refills: 0 | Status: DISCONTINUED | OUTPATIENT
Start: 2024-01-01 | End: 2024-01-01

## 2024-01-01 RX ORDER — CEFTRIAXONE 500 MG/1
1000 INJECTION, POWDER, FOR SOLUTION INTRAMUSCULAR; INTRAVENOUS EVERY 24 HOURS
Refills: 0 | Status: DISCONTINUED | OUTPATIENT
Start: 2024-03-03 | End: 2024-01-01

## 2024-01-01 RX ORDER — AZITHROMYCIN 500 MG/1
500 TABLET, FILM COATED ORAL ONCE
Refills: 0 | Status: DISCONTINUED | OUTPATIENT
Start: 2024-01-01 | End: 2024-01-01

## 2024-01-01 RX ORDER — CHLORHEXIDINE GLUCONATE 213 G/1000ML
1 SOLUTION TOPICAL
Refills: 0 | Status: DISCONTINUED | OUTPATIENT
Start: 2024-01-01 | End: 2024-01-01

## 2024-01-01 RX ORDER — ETOMIDATE 2 MG/ML
10 INJECTION INTRAVENOUS ONCE
Refills: 0 | Status: COMPLETED | OUTPATIENT
Start: 2024-01-01 | End: 2024-01-01

## 2024-01-01 RX ORDER — AZITHROMYCIN 500 MG/1
500 TABLET, FILM COATED ORAL EVERY 24 HOURS
Refills: 0 | Status: DISCONTINUED | OUTPATIENT
Start: 2024-03-03 | End: 2024-01-01

## 2024-01-01 RX ADMIN — ETOMIDATE 10 MILLIGRAM(S): 2 INJECTION INTRAVENOUS at 16:02

## 2024-01-01 RX ADMIN — Medication 1 MILLIGRAM(S): at 17:30

## 2024-01-01 RX ADMIN — Medication 1 MILLIGRAM(S): at 15:50

## 2024-01-01 RX ADMIN — Medication 7.5 MICROGRAM(S)/KG/MIN: at 17:21

## 2024-01-01 RX ADMIN — SODIUM CHLORIDE 1000 MILLILITER(S): 9 INJECTION INTRAMUSCULAR; INTRAVENOUS; SUBCUTANEOUS at 20:25

## 2024-01-01 RX ADMIN — Medication 1 MILLIGRAM(S): at 16:08

## 2024-03-02 NOTE — DISCHARGE NOTE FOR THE EXPIRED PATIENT - HOSPITAL COURSE
89 year old woman with a PMHx of afib and prior CVA. She presented to ED in cardiac arrest. As per granddaughter at bedside, pt was recently admitted to [facility] for afib mngmt and discharged back home. Pt lives at home with 24/7 home health aide and is usually alert and oriented. Over last few days she reported worsening cough, weakness and malaise. No sick contacts. Today, home health aide found pt face down on the floor and called EMS. EMS arrived pt was bradycardic and then had witnessed PEA arrest of 15 minutes. Pt was intubated in the field and IO placed. In the ED pt was on high dose levo and coded 2 more times for another 10 minutes (25 minutes total downtime) and achieved ROSC again. Labs with metabolic acidosis, lactic acidosis, JOLENE, BNP 12K and elevated LFTs. CXR with left sided opacification. POCUS with severe BiV failure and left lower lobe consolidation. Pt with dilated and minimally responsive pupils and posturing to noxious stimuli. Admitted to the ICU for post cardiac arrest management. Pt was made DNR by family.  On arrival to the ICU, pt with increasing norepinephrine requirement. Pt became bradycardic and then asystole on monitor. No spontaneous breaths noted. No response to verbal or tactile stimuli. No palpable pulses. No corneals, no pupillary reflex. Cardiac standstill on ultrasound. Patient pronounced. Patient’s granddaughter narinder was bedside and informed. Pt's nephew Richardson Fofana was also informed of pt’s expiration. Condolences offered.

## 2024-03-02 NOTE — H&P ADULT - HISTORY OF PRESENT ILLNESS
89yo F hx afib, prior CVA presents to ED in cardiac arrest. As per granddaughter at bedside, pt was recently admitted to [facility] for afib mngmt and discharged back home. Pt lives at home with 24/7 home health aide and is usually alert and oriented. Over last few days she reported worsening cough, weakness and malaise. No sick contacts. Today, home health aide found pt face down on the floor and called EMS. EMS arrived pt was bradycardic and then had witnessed PEA arrest of 15 minutes. PT was intubated in the field and IO placed. In the ED pt was on high dose levo and coded 2 more times for another 10 minutes (25 minutes total downtime) and achieved ROSC again. Labs with metabolic acidosis and CXR with left sided opacification. ICU consulted for further mngmt    Subjective: Pt seen and examined at the bedside. Pt with dilated and minimally responsive pupils and posturing to noxious stimuli. Pt satting 93% on 100% fio2 and PEEP 10. /67 on 0.125mcg of Levo with HR NSR 110s    POCUS with severe BiV failure and left lower lobe consolidation    Pt accepted to icu for further mngmt

## 2024-03-02 NOTE — PROVIDER CONTACT NOTE (EICU) - RECOMMENDATIONS
goal normothermia/TTM  titrate norepi gtt to MAP >65  lung protective vent support with PEEP optimization and low VT strategy (6cc/kg/IBW)  GI ppx; hold chemoppx pending CTH findings  ABG  nunes and monitor I/Os  CTH, EEG  trend troponin/serial EKG/telemonitor  UA, blood cultures, PCT, MRSA swab, empiric IV abx coverage  Pt is DNR. Recommend Palliative care consultation. Admit to ICU

## 2024-03-02 NOTE — H&P ADULT - NSICDXPASTMEDICALHX_GEN_ALL_CORE_FT
Anemia PAST MEDICAL HISTORY:  Anxiety     CVA (cerebrovascular accident)     HTN (hypertension)

## 2024-03-02 NOTE — PROVIDER CONTACT NOTE (EICU) - SITUATION
89yo F with PMH of HTN, Afib, hx of CAD, CVA, being admitted to ICU s/p ROSC for PEA arrest x3 now c/b MSOF - notably including JOLENE, transaminitis/ALI, metabolic acidosis, hypoxemic respiratory failure, encephalopathy (possible HIE) and shock.  Critical care PA called for aid in managing admission to ICU  Per [facility] PA, pt with unresponsive, sluggishly reactive pupils b/l, with UE posturing  POCUS with LV hypokinesis and plethoric IVC as per PA  CXR with b/l opacities concerning for PNA and/or pulmonary edema  Patient made DNR by family

## 2024-03-02 NOTE — ED ADULT TRIAGE NOTE - CHIEF COMPLAINT QUOTE
brought by ems as witnessed arrest . intubated on the field with 7.0 at lip 23 . EMS gave 2epi, levophed 8mcg per minute .  As per ems she was talking on scene and she became bradycardic and hypotensive, ems gave one combi and she became more altered . h/o stroke, HTN, brought by ems as witnessed arrest . intubated on the field with 7.0 at lip 23 . EMS gave 2epi, levophed 8mcg per minute .  As per ems she was talking on scene and she became bradycardic and hypotensive, ems gave one combi and she became more altered . h/o stroke, HTN. (Debbie Sewell  ) power of . brought by ems as witnessed arrest . intubated on the field with 7.0 at lip 23 . EMS gave 2epi, levophed 8mcg per minute .  As per ems she was talking on scene and she became bradycardic and hypotensive, ems gave one combi and she became more altered . h/o stroke, HTN. (Debbie Sewell  ) power of . kavon Fofana  nephew .

## 2024-03-02 NOTE — ED ADULT NURSE NOTE - CHIEF COMPLAINT QUOTE
brought by ems as witnessed arrest . intubated on the field with 7.0 at lip 23 . EMS gave 2epi, levophed 8mcg per minute .  As per ems she was talking on scene and she became bradycardic and hypotensive, ems gave one combi and she became more altered . h/o stroke, HTN. (Debbie Sewell  ) power of . kavon Fofana  nephew .

## 2024-03-02 NOTE — ED PROVIDER NOTE - PROGRESS NOTE DETAILS
Fady PGY3: mando/hypotensive again despite escalating levo and atropine. 17:36 pulseless. cardiac compression restarted - x2 epi, x1 bicarb. ROSC achieved, again in rapid narrow tachycardia. Remains off sedation. Fady PGY3: ICU PA at bedside, spoke to family. DNR now. MOLST finished. Admit to micu.

## 2024-03-02 NOTE — ED PROVIDER NOTE - CARE PLAN
1 Principal Discharge DX:	Cardiac arrest   Principal Discharge DX:	Cardiac arrest  Secondary Diagnosis:	Pneumonia

## 2024-03-02 NOTE — ED ADULT NURSE REASSESSMENT NOTE - NS ED NURSE REASSESS COMMENT FT1
Pt received from Day RN. Pt on ventilator and normal sinus on the monitor. Pt appears to be moving lower extremities to painful stimuli. Pt awaiting room in ICU.

## 2024-03-02 NOTE — H&P ADULT - NSHPPHYSICALEXAM_GEN_ALL_CORE
GENERAL: postures to noxious stimuli  HEAD:  Atraumatic, normocephalic  EYES: dilated and minimally responsive   NECK: Supple, trachea midline, no JVD  HEART: Regular rate and rhythm  LUNGS: mechanical BS bilat  ABDOMEN: Soft, nontender, nondistended  EXTREMITIES: cool bilat with + pulses. no LE edema  NERVOUS SYSTEM:  postures UE to noxious stimuli, not following commands, no gag/cough

## 2024-03-02 NOTE — ED PROVIDER NOTE - CLINICAL SUMMARY MEDICAL DECISION MAKING FREE TEXT BOX
Fady PGY3: 89yo F with PMH of HTN, Afib, hx of CAD, CVA presents to ED for eval cardiac arrest after complaining of acute SOB. Loss of pulse x2, 1 with EMS and 1 with ED staff. Differential Diagnosis includes but not limited to massive PE vs cardiac event vs CHB vs metabolic abberation (hyperK). Labs, monitoring. continue to discuss GOC with family.

## 2024-03-02 NOTE — H&P ADULT - NSHPLABSRESULTS_GEN_ALL_CORE
LABS:                          10.9   10.37 )-----------( 87       ( 02 Mar 2024 16:47 )             34.7     03-02    140  |  109<H>  |  19  ----------------------------<  186<H>  4.4   |  16<L>  |  1.49<H>    Ca    7.4<L>      02 Mar 2024 16:47  Phos  5.9     03-02  Mg     2.0     03-02    TPro  5.4<L>  /  Alb  2.5<L>  /  TBili  1.3<H>  /  DBili  x   /  AST  236<H>  /  ALT  148<H>  /  AlkPhos  150<H>  03-02

## 2024-03-02 NOTE — ED PROVIDER NOTE - PHYSICAL EXAMINATION
exam limited by mental status and actively coding    - obtunded, GCS 3  - pupils 3mm b/l reactive w/o tracking or blink to threat, fixed  - HR slow  - lungs CTA   - abd soft, non-distended  - b/l LE edema to knees

## 2024-03-02 NOTE — ED PROVIDER NOTE - OBJECTIVE STATEMENT
89yo F with PMH of HTN, Afib, hx of CAD, CVA presents to ED for eval cardiac arrest. Per EMS report she was with her HHA and she suddenly began complaining of sob then became agonal. On EMS arrival was found to be pulseless, asystole then PEA with ACS protocol followed - ROSC achieved after 15min. Intubated in field and transferred to ED on levo via IO. On arrival patient was profoundly hypotensive and mando to ~40. Attempted IVF, atropine and ext pacing but patient eventually lost pulse with compressions re-initiated. 5min of compressions wiht x2 epi, bicarb, calcium gluc given with ROSC achieved. Family unsure of care for CVA (recent at prior hospital but unknown details, spent 1 night?).

## 2024-03-02 NOTE — CHART NOTE - NSCHARTNOTEFT_GEN_A_CORE
:  Dave Baumann PA-C    Indication:  SHOCK    PROCEDURE:  [x ] LIMITED ECHO  [x ] LIMITED CHEST  [ ] LIMITED RETROPERITONEAL  [ ] LIMITED ABDOMINAL  [ ] LIMITED DVT  [ ] NEEDLE GUIDANCE VASCULAR  [ ] NEEDLE GUIDANCE THORACENTESIS  [ ] NEEDLE GUIDANCE PARACENTESIS  [ ] NEEDLE GUIDANCE PERICARDIOCENTESIS  [ ] OTHER    FINDINGS:  Chest: posterior bibasilar B lines with LLL consolidation. no effusion bilat    ECHO: LV=RV size with Severely reduced BiV systolic function with bilat dilated RV and LV with mild RV systolic septal bowing  No pericardial effusion  IVC: plethoric with minimal variation      INTERPRETATION:  lung exam with intersitial pattern and LLL consolidation  cardiac exam with severely reduced BiV function and mild RV septal bowing. likely cardiogenic component to shock state      images uploaded to [employer]

## 2024-03-02 NOTE — PROVIDER CONTACT NOTE (EICU) - ASSESSMENT
ROSC s/p PEA arrest x3 with exam concerning for HIE  MSOF  hypoxic respiratory failure possibly due to PNA and/or pulmonary edema  CHF; NSTEMI likely demand in post arrest setting   shock of multifactorial etiology  metabolic acidosis  JOLENE  transaminitis

## 2024-03-02 NOTE — ED PROVIDER NOTE - CRITICAL CARE ATTENDING CONTRIBUTION TO CARE
89 year old female with h/o htn, afib, cad and cva presents today biba from home in cardiac arrest, per ems pt was with her home health aide who reports that pt became acutely sob, on arrival pt was on the floor in respiratory distress and agonal, hypotensive and breadycadic, shortly went into cardiac arrest, pt was intubated in the field, initially in asystole then PEA, received epix 2 and achieved rosc after 15 min, upon arrival to the ER pt is hypotensive and bradycardic with pulses, acls protocol followed, however pt did arrest again, received bicarb, atropine and calcium with rosc after five minutes, ct shows multifocal pneumonia, sternal and rib fractures likely due to cpr,  pt admitted to icu

## 2024-03-02 NOTE — GOALS OF CARE CONVERSATION - ADVANCED CARE PLANNING - CONVERSATION DETAILS
Spoke with pt's granddaughter at bedside and pt's niece Kb Fofana over the phone. They endorsed that kavon is the HCP and has the documentation but doesn't recall where it is. Kb's other brother is out of state and also agrees he is the HCP. I explained that the pt has had a prolonged cardiac arrest and is current on very high vent settings, high dose pressors and is very ill. I also explained that she is posturing to pain which may be indicative of anoxic injury already. I explained that in her current state her progonsis is extremely poor and thus further efforts of CPR are not likely to improve her overall outcome and just cause undo suffering. They said they don't want her to suffer anymore. They all agreed to DNR with continuation of vasopressors until 1mcg (which I explained to them is a very high dose and close to max). They said they just want to make sure she is comfortable.   DNR signed in chart. All questions answered

## 2024-03-02 NOTE — ED PROVIDER NOTE - CONVERSATION DETAILS
Reviewed chart, recent CVA hx in a hospital in [facility] unknown deficits/care/results.  Spoke to Nephew who is HCP - Richardson Fofana [phone]  Explained sequence of events. He is living in [place]. No immediate family.     Was DNR/DNI in 10/2022 per review of chart. Discussed current status with him and explained concern for poor overall status and unlikely to have return to full functional status or be safe surgical candidate in setting of current instability and other health issues. Nephew understood. REquests to speak with another family member, will recall @ 5:30pm to continue conversation. Reviewed chart, recent CVA hx in a hospital in [facility] unknown deficits/care/results.  Spoke to Nephew who is HCP - Richardson Fofana [phone]  Explained sequence of events. He is living in [place]. No immediate family.     Was DNR/DNI in 10/2022 per review of chart. Discussed current status with him and explained concern for poor overall status and unlikely to have return to full functional status or be safe surgical candidate in setting of current instability and other health issues. Nephew understood. Requests to speak with another family member, will recall @ 5:30pm to continue conversation.    UPDATE 5:32pm: spoke to HCP again, he spoke to another family member and given her mental state was well before this, he and they would want full code. no limits on care. aware that she is unlikely retained prior status with current level of need and patient again vivi-arrest but he would want full code.

## 2024-03-02 NOTE — H&P ADULT - ASSESSMENT
89yo F hx afib, prior CVA presents to ED s/p PEA arrest with ~25 min down time 2/2 pneumonia vs ADHF. Admitted to icu for post cardiac arrest mngmt    Neuro:  -  no cough/gag and posturing UE with dilated pupils  -pending CTH to eval for  anoxic brain injury post cardiac arrest  - maintain off sedation for accurate MS assessment   - fever control,  avoid metabolic derangements, and maintain adeqaute MAP to defend cerebral perfusion post arrest    Resp:  -intubated for cardiac arrest  -CXR with left sided opacification and POCUS with posterior B lines and LLL consolidation likely 2/2 pna   -requiring 100% and PEEP 10 on vent for sat>92%  -titrate vent settings to ABGs  -f/u CT chest for further eval of opacities     CV:  - PEA arrest with ~20-25  down time likely 2/2 sepsis from pna vs ADHF  -post arrest EKG without ST elevations but with some anteiror lateral T wave inversion. will check and trend trops and start ASA if CTH without bleed   -POCUS with several reduced BiV systolic function with mild RV septal bowing and dilation and plethoric IVC 2/2 post arrest vs ADHF that precipitated arrest  -doesnt appear volume overloaded, no role for diuretics  - CTA pending to r/o PE with mild RV systolic bowing   - currently on levo 0.125, will cap at 1mcg as per discussion with family   - titrate pressors for MAP>65  - abx as below       GI:  - npo for now while on high dose vasopressors  -abd soft and nondistended  - GI ppx: Protonix      Renal:  - JOLENE likely ATN from cardiac arrest  -cont to trend crn on bmp  -nunes for strict I/Os  -replace lytes as necessary      ID:  sepsis 2/2  pna  -afebrile with leukocytosis   - imaging with evidence of L sided opacifications  - cont ceftriaxone and zithro for CAP coverage and f/u pan cultures    Heme:  -cbc stable   -DVT ppx: SCDS for now but will consider full A/C vs DVT ppx with hx of afib if CTH without ICH    Endo:   -maintain FS<180 with ISS    Ethics:  - Kb ([phone]) is HCP and is in agreement with family at bedside to make pt DNR and cap levo at 1mcg. see GOC discussion for further details    d/w eICU attending     i spent 60 minutes assessing presenting problems of acute illness, which pose high probability of life threatening deterioration or end organ damage/dysfunction, as well as medical decision making including initiating plan of care, reviewing data, reviewing radiologic exams, discussing with multidisciplinary team,  discussing goals of care with patient/family, and writing this note.  Non-inclusive of procedures performed,

## 2024-03-03 LAB
CULTURE RESULTS: SIGNIFICANT CHANGE UP
PROCALCITONIN SERPL-MCNC: 2.35 NG/ML — HIGH (ref 0.02–0.1)
SPECIMEN SOURCE: SIGNIFICANT CHANGE UP

## 2024-03-05 LAB
M PNEUMO IGM SER-ACNC: 0.19 INDEX — SIGNIFICANT CHANGE UP (ref 0–0.9)
MYCOPLASMA AG SPEC QL: NEGATIVE — SIGNIFICANT CHANGE UP

## 2024-03-07 DIAGNOSIS — E87.20 ACIDOSIS, UNSPECIFIED: ICD-10-CM

## 2024-03-07 DIAGNOSIS — Z79.82 LONG TERM (CURRENT) USE OF ASPIRIN: ICD-10-CM

## 2024-03-07 DIAGNOSIS — Z66 DO NOT RESUSCITATE: ICD-10-CM

## 2024-03-07 DIAGNOSIS — N17.0 ACUTE KIDNEY FAILURE WITH TUBULAR NECROSIS: ICD-10-CM

## 2024-03-07 DIAGNOSIS — J18.9 PNEUMONIA, UNSPECIFIED ORGANISM: ICD-10-CM

## 2024-03-07 DIAGNOSIS — A41.9 SEPSIS, UNSPECIFIED ORGANISM: ICD-10-CM

## 2024-03-07 DIAGNOSIS — R57.9 SHOCK, UNSPECIFIED: ICD-10-CM

## 2024-03-07 DIAGNOSIS — R57.0 CARDIOGENIC SHOCK: ICD-10-CM

## 2024-03-07 DIAGNOSIS — Z86.73 PERSONAL HISTORY OF TRANSIENT ISCHEMIC ATTACK (TIA), AND CEREBRAL INFARCTION WITHOUT RESIDUAL DEFICITS: ICD-10-CM

## 2024-03-07 DIAGNOSIS — I48.91 UNSPECIFIED ATRIAL FIBRILLATION: ICD-10-CM

## 2024-03-07 DIAGNOSIS — I46.8 CARDIAC ARREST DUE TO OTHER UNDERLYING CONDITION: ICD-10-CM

## 2024-03-07 DIAGNOSIS — G93.1 ANOXIC BRAIN DAMAGE, NOT ELSEWHERE CLASSIFIED: ICD-10-CM

## 2024-03-07 DIAGNOSIS — R00.1 BRADYCARDIA, UNSPECIFIED: ICD-10-CM

## 2024-03-07 DIAGNOSIS — I95.9 HYPOTENSION, UNSPECIFIED: ICD-10-CM

## 2024-03-07 DIAGNOSIS — F41.9 ANXIETY DISORDER, UNSPECIFIED: ICD-10-CM

## 2024-03-07 DIAGNOSIS — I25.10 ATHEROSCLEROTIC HEART DISEASE OF NATIVE CORONARY ARTERY WITHOUT ANGINA PECTORIS: ICD-10-CM

## 2024-03-07 DIAGNOSIS — I50.82 BIVENTRICULAR HEART FAILURE: ICD-10-CM

## 2024-03-07 DIAGNOSIS — Z74.1 NEED FOR ASSISTANCE WITH PERSONAL CARE: ICD-10-CM

## 2024-03-07 DIAGNOSIS — I10 ESSENTIAL (PRIMARY) HYPERTENSION: ICD-10-CM

## 2024-06-18 NOTE — BEHAVIORAL HEALTH ASSESSMENT NOTE - ESTIMATED INTELLIGENCE
Is This A New Presentation, Or A Follow-Up?: Skin Lesions
How Severe Is Your Skin Lesion?: mild
Average

## 2024-08-15 NOTE — ED PROVIDER NOTE - PROGRESS NOTE DETAILS
Nutrition:   -Continue intermittent fasting, eat breakfast around 10 am, meal at 2pm, keep dinner at 6pm  -Goal of 64 oz of calorie free fluid (Crystal light, propel, flavored water, seltzer water)   Aim for 100 g of protein  Protein with everything you eat     High protein snacks  Greek yogurt: oikios and chibanni ( ratio is very smooth)  Cottage cheese  String cheese  Beef sticks: chomp  Eggs  Deli meat roll ups  Nuts  Protein bars: fit crunch, builder bars, built bar puffs, barebell (swapna dough)  Protein shakes  [place] cakes  Sausage links: kraig gomez turkey or donovan chicken  Jerky  Vital protein- collagen peptides  Bare  chicken    Physical activity:   -Pilates 3 times per week 20 minutes  -Add 20 minutes of walking on the days you dont to pilates.   -Continue wall sits and stretching.      Strength Training:  Do Muscle strengthening exercises of moderate or greater intensity at least 2 days a week  Do exercises to strengthen all muscle groups: legs, hips, back, chest, abdomen, shoulders and arms  Complete 10 repetitions (one set) for each exercise. Repeat the set three times for each exercise  Can use resistance bands, or body weight exercises: no gym required  EX: push- ups, lunges, squats, plank, jumping jacks, arm curls,      Non-Exercise Avtivity Time (NEAT)  Park further away from entrances  Take the stairs rather than elevators  Stand instead of sit     Behaviors:   -stress management: mindfulness breaks 3 times per day  Continue with CPAP nightly     Medications:   -continue wellbutrin  -Continue phentermine and topiramate    Dr. Juárez cardiologist is called thru his service. Pt has been alert and oriented denies headache, dizziness, sob, chest pain nausea, vomiting, abd pain Dr/. Jair is notified and admit pt.
